# Patient Record
Sex: MALE | Race: WHITE | NOT HISPANIC OR LATINO | Employment: FULL TIME | URBAN - METROPOLITAN AREA
[De-identification: names, ages, dates, MRNs, and addresses within clinical notes are randomized per-mention and may not be internally consistent; named-entity substitution may affect disease eponyms.]

---

## 2018-01-30 ENCOUNTER — OFFICE VISIT (OUTPATIENT)
Dept: FAMILY MEDICINE | Facility: CLINIC | Age: 43
End: 2018-01-30
Payer: COMMERCIAL

## 2018-01-30 VITALS
OXYGEN SATURATION: 95 % | DIASTOLIC BLOOD PRESSURE: 96 MMHG | BODY MASS INDEX: 38.38 KG/M2 | TEMPERATURE: 96.8 F | WEIGHT: 289.6 LBS | HEART RATE: 79 BPM | HEIGHT: 73 IN | SYSTOLIC BLOOD PRESSURE: 136 MMHG

## 2018-01-30 DIAGNOSIS — G47.19 EXCESSIVE DAYTIME SLEEPINESS: ICD-10-CM

## 2018-01-30 DIAGNOSIS — Z13.220 SCREENING FOR LIPID DISORDERS: ICD-10-CM

## 2018-01-30 DIAGNOSIS — Z00.01 ENCOUNTER FOR ROUTINE ADULT MEDICAL EXAM WITH ABNORMAL FINDINGS: Primary | ICD-10-CM

## 2018-01-30 DIAGNOSIS — R06.83 SNORING: ICD-10-CM

## 2018-01-30 DIAGNOSIS — R09.81 NASAL CONGESTION: ICD-10-CM

## 2018-01-30 DIAGNOSIS — Z13.1 SCREENING FOR DIABETES MELLITUS: ICD-10-CM

## 2018-01-30 LAB
CHOLEST SERPL-MCNC: 177 MG/DL
GLUCOSE SERPL-MCNC: 80 MG/DL (ref 70–99)
HDLC SERPL-MCNC: 29 MG/DL
LDLC SERPL CALC-MCNC: 84 MG/DL
NONHDLC SERPL-MCNC: 148 MG/DL
TRIGL SERPL-MCNC: 320 MG/DL
TSH SERPL DL<=0.005 MIU/L-ACNC: 2.16 MU/L (ref 0.4–4)

## 2018-01-30 PROCEDURE — 99213 OFFICE O/P EST LOW 20 MIN: CPT | Mod: 25 | Performed by: FAMILY MEDICINE

## 2018-01-30 PROCEDURE — 99396 PREV VISIT EST AGE 40-64: CPT | Performed by: FAMILY MEDICINE

## 2018-01-30 PROCEDURE — 84443 ASSAY THYROID STIM HORMONE: CPT | Performed by: FAMILY MEDICINE

## 2018-01-30 PROCEDURE — 36415 COLL VENOUS BLD VENIPUNCTURE: CPT | Performed by: FAMILY MEDICINE

## 2018-01-30 PROCEDURE — 82947 ASSAY GLUCOSE BLOOD QUANT: CPT | Performed by: FAMILY MEDICINE

## 2018-01-30 PROCEDURE — 80061 LIPID PANEL: CPT | Performed by: FAMILY MEDICINE

## 2018-01-30 RX ORDER — FLUTICASONE PROPIONATE 50 MCG
1-2 SPRAY, SUSPENSION (ML) NASAL DAILY
Qty: 3 BOTTLE | Refills: 1 | Status: SHIPPED | OUTPATIENT
Start: 2018-01-30 | End: 2019-12-06

## 2018-01-30 NOTE — PROGRESS NOTES
SUBJECTIVE:   CC: Dion Tatum is an 42 year old male who presents for preventative health visit.     Healthy Habits:    Do you get at least three servings of calcium containing foods daily (dairy, green leafy vegetables, etc.)? yes    Amount of exercise or daily activities, outside of work: 1 day(s) per week -     Problems taking medications regularly No    Medication side effects: No    Have you had an eye exam in the past two years? yes    Do you see a dentist twice per year? yes    Do you have sleep apnea, excessive snoring or daytime drowsiness?yes         Patient understands that additional issues/concerns addressed,  which are above and beyond typical preventative care, may be charged as an extra cost to the patient.     Tired - worsened over the last 3-4 months    Cold symptoms several months ago and nose has never cleared.   Nostrils feel narrowed   Hard to breathe at night   Wakes often  Some snoring     Allergies as a child, not as an adult   Tried allergy pill  Tried breathe right strips  Tried afrin      The 10-year ASCVD risk score (Leticiajama GOTTLIEB Jr, et al., 2013) is: 2.6%    Values used to calculate the score:      Age: 42 years      Sex: Male      Is Non- : No      Diabetic: No      Tobacco smoker: No      Systolic Blood Pressure: 141 mmHg      Is BP treated: No      HDL Cholesterol: 32 mg/dL      Total Cholesterol: 172 mg/dL      Today's PHQ-2 Score: 0  PHQ-2 ( 1999 Pfizer) 1/30/2018 1/17/2018   Q1: Little interest or pleasure in doing things 0 0   Q2: Feeling down, depressed or hopeless 0 0   PHQ-2 Score 0 0   Q1: Little interest or pleasure in doing things - Not at all   Q2: Feeling down, depressed or hopeless - Not at all   PHQ-2 Score - 0       Abuse: Current or Past(Physical, Sexual or Emotional)- No  Do you feel safe in your environment - Yes    Social History   Substance Use Topics     Smoking status: Never Smoker     Smokeless tobacco: Never Used     Alcohol use 0.0  "oz/week     0 Standard drinks or equivalent per week      Comment: occasional      If you drink alcohol do you typically have >3 drinks per day or >7 drinks per week? No                      Last PSA: No results found for: PSA    Reviewed orders with patient. Reviewed health maintenance and updated orders accordingly - Yes  Labs reviewed in EPIC    Reviewed and updated as needed this visit by clinical staff         Reviewed and updated as needed this visit by Provider            ROS:  C: NEGATIVE for fever, chills, change in weight  CONSTITUTIONAL:tired  E: NEGATIVE for vision changes or irritation  ENT: nasal congestion  R: NEGATIVE for significant cough or SOB  CV: NEGATIVE for chest pain, palpitations or peripheral edema  GI: NEGATIVE for nausea, abdominal pain, heartburn, or change in bowel habits   male: negative for dysuria, hematuria, decreased urinary stream, erectile dysfunction, urethral discharge  M: NEGATIVE for significant arthralgias or myalgia  N: NEGATIVE for weakness, dizziness or paresthesias  P: NEGATIVE for changes in mood or affect    OBJECTIVE:   BP (!) 136/96 (BP Location: Right arm, Patient Position: Sitting, Cuff Size: Adult Large)  Pulse 79  Temp 96.8  F (36  C) (Tympanic)  Ht 6' 1\" (1.854 m)  Wt 289 lb 9.6 oz (131.4 kg)  SpO2 95%  BMI 38.21 kg/m2   BP Readings from Last 6 Encounters:   01/30/18 (!) 141/99   01/30/18 (!) 136/96   01/11/17 130/89   03/07/16 (!) 146/96   12/29/15 136/89   04/02/14 122/79     Wt Readings from Last 10 Encounters:   01/30/18 289 lb 9.6 oz (131.4 kg)   01/30/18 289 lb 9.6 oz (131.4 kg)   12/29/16 291 lb (132 kg)   03/07/16 284 lb (128.8 kg)   12/29/15 287 lb 14.4 oz (130.6 kg)   04/02/14 288 lb (130.6 kg)   03/24/14 279 lb (126.6 kg)   03/12/14 281 lb 6.4 oz (127.6 kg)   06/01/12 275 lb 12.8 oz (125.1 kg)   02/17/12 279 lb 3.2 oz (126.6 kg)       EXAM:  GENERAL: healthy, alert and no distress  EYES: Eyes grossly normal to inspection, PERRL and " conjunctivae and sclerae normal  HENT: ear canals and TM's normal, nose and mouth without ulcers or lesions  NECK: no adenopathy, no asymmetry, masses, or scars and thyroid normal to palpation  Neck is wide.   RESP: lungs clear to auscultation - no rales, rhonchi or wheezes  CV: regular rate and rhythm, normal S1 S2, no S3 or S4, no murmur, click or rub, no peripheral edema and peripheral pulses strong  ABDOMEN: soft, nontender, no hepatosplenomegaly, no masses and bowel sounds normal  MS: no gross musculoskeletal defects noted, no edema  SKIN: no suspicious lesions or rashes  NEURO: Normal strength and tone, mentation intact and speech normal  PSYCH: mentation appears normal, affect normal/bright    ASSESSMENT/PLAN:     (Z00.01) Encounter for routine adult medical exam with abnormal findings  (primary encounter diagnosis)  Comment: We discussed self testicular exams, exercise 30mins/day, and calcium with vitamin D at 1200mg/day, preferably from dietary sources.  Diet, Weight loss, and Exercise were discussed as well.   Plan:     (Z13.220) Screening for lipid disorders  Comment:   Plan: Lipid panel reflex to direct LDL Fasting            (Z13.1) Screening for diabetes mellitus  Comment:   Plan: Glucose            (G47.19) Excessive daytime sleepiness  Comment: Discussed symptoms and start with sleep evaluation   Plan: SLEEP EVALUATION & MANAGEMENT REFERRAL - [facility]  [phone]        (Age 2 and up), TSH with free T4 reflex            (R06.83) Snoring  Comment:   Plan: SLEEP EVALUATION & MANAGEMENT REFERRAL - [facility]  [phone]        (Age 2 and up)            (R09.81) Nasal congestion  Comment: Discussed trial of allergy meds and neti pot. Be persistent as this can take several weeks to work.   Plan: fluticasone (FLONASE) 50 MCG/ACT spray            COUNSELING:  Reviewed preventive health counseling, as reflected in patient  "instructions       Regular exercise       Healthy diet/nutrition    BP Screening:   Last 3 BP Readings:    BP Readings from Last 3 Encounters:   01/30/18 (!) 141/99   01/30/18 (!) 136/96   01/11/17 130/89       The following was recommended to the patient:  Recommend lifestyle modifications AND TESTING FOR JEFF     reports that he has never smoked. He has never used smokeless tobacco.    Estimated body mass index is 37.11 kg/(m^2) as calculated from the following:    Height as of 12/29/16: 6' 2.25\" (1.886 m).    Weight as of 12/29/16: 291 lb (132 kg).   Weight management plan: Discussed healthy diet and exercise guidelines and patient will follow up in 6 months in clinic to re-evaluate.    Counseling Resources:  ATP IV Guidelines  Pooled Cohorts Equation Calculator  FRAX Risk Assessment  ICSI Preventive Guidelines  Dietary Guidelines for Americans, 2010  USDA's MyPlate  ASA Prophylaxis  Lung CA Screening    Azalea Cervantes MD  [facility]  "

## 2018-01-30 NOTE — MR AVS SNAPSHOT
After Visit Summary   1/30/2018    Doin Tatum    MRN: [id number]           Patient Information     Date Of Birth          1975        Visit Information        Provider Department      1/30/2018 7:00 AM Azalea Cervantes MD [facility]        Today's Diagnoses     Screening for lipid disorders    -  1    Routine general medical examination at a health care facility        Encounter for routine adult medical exam with abnormal findings        Screening for diabetes mellitus        Excessive daytime sleepiness        Snoring        Nasal congestion          Care Instructions      Nasal congestion  Daily use of flonase - prescribe   Daily use of cetirizine 10mg/day ( zyrtec)        Preventive Health Recommendations  Male Ages 40 to 49    Yearly exam:             See your health care provider every year in order to  o   Review health changes.   o   Discuss preventive care.    o   Review your medicines if your doctor has prescribed any.    You should be tested each year for STDs (sexually transmitted diseases) if you re at risk.     Have a cholesterol test every 5 years.     Have a colonoscopy (test for colon cancer) if someone in your family has had colon cancer or polyps before age 50.     After age 45, have a diabetes test (fasting glucose). If you are at risk for diabetes, you should have this test every 3 years.      Talk with your health care provider about whether or not a prostate cancer screening test (PSA) is right for you.    Shots: Get a flu shot each year. Get a tetanus shot every 10 years.     Nutrition:    Eat at least 5 servings of fruits and vegetables daily.     Eat whole-grain bread, whole-wheat pasta and brown rice instead of white grains and rice.     Talk to your provider about Calcium and Vitamin D.     Lifestyle    Exercise for at least 150 minutes a week (30 minutes a day, 5 days a week). This will help you control your weight and prevent disease.     Limit  alcohol to one drink per day.     No smoking.     Wear sunscreen to prevent skin cancer.     See your dentist every six months for an exam and cleaning.              Follow-ups after your visit        Additional Services     SLEEP EVALUATION & MANAGEMENT REFERRAL - [facility]  [phone] (Age 2 and up)       Please be aware that coverage of these services is subject to the terms and limitations of your health insurance plan.  Call member services at your health plan with any benefit or coverage questions.      Please bring the following to your appointment:    >>   List of current medications   >>   This referral request   >>   Any documents/labs given to you for this referral                      Future tests that were ordered for you today     Open Future Orders        Priority Expected Expires Ordered    SLEEP EVALUATION & MANAGEMENT REFERRAL - [facility]  [phone] (Age 2 and up) Routine  1/30/2019 1/30/2018            Who to contact     Normal or non-critical lab and imaging results will be communicated to you by Sclobyhart, letter or phone within 4 business days after the clinic has received the results. If you do not hear from us within 7 days, please contact the clinic through Sclobyhart or phone. If you have a critical or abnormal lab result, we will notify you by phone as soon as possible.  Submit refill requests through pickrset or call your pharmacy and they will forward the refill request to us. Please allow 3 business days for your refill to be completed.          If you need to speak with a  for additional information , please call: [phone]             Additional Information About Your Visit        pickrset Information     pickrset gives you secure access to your electronic health record. If you see a primary care provider, you can also send messages to your care team and make appointments. If you have questions, please call  "your primary care clinic.  If you do not have a primary care provider, please call [phone] and they will assist you.        Care EveryWhere ID     This is your Care EveryWhere ID. This could be used by other organizations to access your [place] medical records  [id number]        Your Vitals Were     Pulse Temperature Height Pulse Oximetry BMI (Body Mass Index)       79 96.8  F (36  C) (Tympanic) 6' 1\" (1.854 m) 95% 38.21 kg/m2        Blood Pressure from Last 3 Encounters:   01/30/18 (!) 141/99   01/30/18 (!) 136/96   01/11/17 130/89    Weight from Last 3 Encounters:   01/30/18 289 lb 9.6 oz (131.4 kg)   01/30/18 289 lb 9.6 oz (131.4 kg)   12/29/16 291 lb (132 kg)              We Performed the Following     Glucose     Lipid panel reflex to direct LDL Fasting     TSH with free T4 reflex          Today's Medication Changes          These changes are accurate as of 1/30/18  7:48 AM.  If you have any questions, ask your nurse or doctor.               Start taking these medicines.        Dose/Directions    fluticasone 50 MCG/ACT spray   Commonly known as:  FLONASE   Used for:  Nasal congestion   Started by:  Azalea Cervantes MD        Dose:  1-2 spray   Spray 1-2 sprays into both nostrils daily   Quantity:  3 Bottle   Refills:  1            Where to get your medicines      These medications were sent to [place] [address] [place] [address]  [address] [facility] [address]     Phone:  [phone]     fluticasone 50 MCG/ACT spray                Primary Care Provider Office Phone # Fax #    Briana Rivera [phone] [address]  [facility] [address]        Equal Access to Services     NASEEM ENGEL AH: Hadmarti Summers, waaxda luqadaha, qaybta kaalmada rachael, papo harman. So [facility] [phone].    ATENCIÓN: Si habla español, tiene a zamudio disposición servicios gratuitos de asistencia lingüística. Llame al " [phone].    We comply with applicable federal civil rights laws and [place] laws. We do not discriminate on the basis of race, color, national origin, age, disability, sex, sexual orientation, or gender identity.            Thank you!     Thank you for choosing [facility]  for your care. Our goal is always to provide you with excellent care. Hearing back from our patients is one way we can continue to improve our services. Please take a few minutes to complete the written survey that you may receive in the mail after your visit with us. Thank you!             Your Updated Medication List - Protect others around you: Learn how to safely use, store and throw away your medicines at www.disposemymeds.org.          This list is accurate as of 1/30/18  7:48 AM.  Always use your most recent med list.                   Brand Name Dispense Instructions for use Diagnosis    fish oil-omega-3 fatty acids 1000 MG capsule      Take 2 g by mouth daily.        fluticasone 50 MCG/ACT spray    FLONASE    3 Bottle    Spray 1-2 sprays into both nostrils daily    Nasal congestion       FRUIT & VEGETABLE DAILY PO      Take  by mouth.

## 2018-01-30 NOTE — PATIENT INSTRUCTIONS
Nasal congestion  Daily use of flonase - prescribe   Daily use of cetirizine 10mg/day ( zyrtec)        Preventive Health Recommendations  Male Ages 40 to 49    Yearly exam:             See your health care provider every year in order to  o   Review health changes.   o   Discuss preventive care.    o   Review your medicines if your doctor has prescribed any.    You should be tested each year for STDs (sexually transmitted diseases) if you re at risk.     Have a cholesterol test every 5 years.     Have a colonoscopy (test for colon cancer) if someone in your family has had colon cancer or polyps before age 50.     After age 45, have a diabetes test (fasting glucose). If you are at risk for diabetes, you should have this test every 3 years.      Talk with your health care provider about whether or not a prostate cancer screening test (PSA) is right for you.    Shots: Get a flu shot each year. Get a tetanus shot every 10 years.     Nutrition:    Eat at least 5 servings of fruits and vegetables daily.     Eat whole-grain bread, whole-wheat pasta and brown rice instead of white grains and rice.     Talk to your provider about Calcium and Vitamin D.     Lifestyle    Exercise for at least 150 minutes a week (30 minutes a day, 5 days a week). This will help you control your weight and prevent disease.     Limit alcohol to one drink per day.     No smoking.     Wear sunscreen to prevent skin cancer.     See your dentist every six months for an exam and cleaning.

## 2018-01-30 NOTE — NURSING NOTE
"Chief Complaint   Patient presents with     Physical       Initial BP (!) 136/96 (BP Location: Right arm, Patient Position: Sitting, Cuff Size: Adult Large)  Pulse 79  Temp 96.8  F (36  C) (Tympanic)  Ht 6' 1\" (1.854 m)  Wt 289 lb 9.6 oz (131.4 kg)  SpO2 95%  BMI 38.21 kg/m2 Estimated body mass index is 38.21 kg/(m^2) as calculated from the following:    Height as of this encounter: 6' 1\" (1.854 m).    Weight as of this encounter: 289 lb 9.6 oz (131.4 kg).  Medication Reconciliation: complete   Omaira Hamlin LPN    "

## 2018-02-02 ENCOUNTER — OFFICE VISIT (OUTPATIENT)
Dept: SLEEP MEDICINE | Facility: CLINIC | Age: 43
End: 2018-02-02
Payer: COMMERCIAL

## 2018-02-02 VITALS
OXYGEN SATURATION: 92 % | BODY MASS INDEX: 37.63 KG/M2 | HEIGHT: 74 IN | SYSTOLIC BLOOD PRESSURE: 148 MMHG | WEIGHT: 293.2 LBS | DIASTOLIC BLOOD PRESSURE: 92 MMHG | HEART RATE: 83 BPM

## 2018-02-02 DIAGNOSIS — E66.09 OBESITY DUE TO EXCESS CALORIES, UNSPECIFIED CLASSIFICATION, UNSPECIFIED WHETHER SERIOUS COMORBIDITY PRESENT: ICD-10-CM

## 2018-02-02 DIAGNOSIS — R06.81 APNEA: Primary | ICD-10-CM

## 2018-02-02 DIAGNOSIS — R06.83 SNORING: ICD-10-CM

## 2018-02-02 DIAGNOSIS — G47.19 EXCESSIVE DAYTIME SLEEPINESS: ICD-10-CM

## 2018-02-02 PROCEDURE — 99205 OFFICE O/P NEW HI 60 MIN: CPT | Performed by: FAMILY MEDICINE

## 2018-02-02 NOTE — PROGRESS NOTES
Sleep Consultation:    Date on this visit: 2/2/2018    Dion Tatum is sent by Azalea Cervantes for a sleep consultation regarding high likelihood of sleep-disordered breathing.    Primary Physician: Briana Rivera       HPI:  Dion Tatum is a 43yo male with a past medical history significant for obesity (BMI 37), elevated blood pressures, chronic fatigue and anxiety who presents to the sleep clinic to assess for obstructive sleep apnea.     Dion was first seen at the sleep clinic (4/2/2014) for concerns loud snoring. Since then he has noticed increased fatigue. At that time, he use to sleep with his wife and she did not notice any observed apneic events. Today, it is becoming more frequent that they sleep separate due to his snoring. He predominately sleeps on his back and side. He says that he goes to be around 11pm and it takes him 30mins to fall asleep. He then wakes up 1-2 times in the middle of the night to go to the bathroom or to chance position. If he cant seem to fall back asleep he will watch TV. He wakes up at 730am to see his kids off to school. He gets about 7-8 hours of sleep per night. At times he feels like he has restless nights of sleep and has poor sleep quality. The has worrying thoughts that keep him up longer than he would anticipate. To accommodate the fatigue he drinks 2-3 cups of coffee in the morning and a diet soda in the afternoon. He has a pretty stressful life style. 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     Of note, he wakes up sometimes with a stress headache that he thinks stems from his neck and back because they are sore too. He does not take naps because he is so busy. He can stay awake during the day without trouble. He has not been in any MVA because of sleep related issues.  Patient's [place]ron Sleepiness score 7/24 consistent with mild daytime sleepiness.He is adopted so he does not know his family history.     Allergies:     Allergies   Allergen Reactions     Penicillins        Medications:    Current Outpatient Prescriptions   Medication Sig Dispense Refill     fluticasone (FLONASE) 50 MCG/ACT spray Spray 1-2 sprays into both nostrils daily 3 Bottle 1     fish oil-omega-3 fatty acids (FISH OIL) 1000 MG capsule Take 2 g by mouth daily.       Nutritional Supplements (FRUIT & VEGETABLE DAILY PO) Take  by mouth.         Problem List:  Patient Active Problem List    Diagnosis Date Noted     Obesity (BMI 30-39.9) 12/29/2015     Priority: Medium     Shoulder joint pain 03/12/2014     Priority: Medium     Home program and will call if wants physical therapy.        Atypical chest pain 03/12/2014     Priority: Medium     Cardiac stress test. Low likelihood.        Generalized anxiety disorder 08/16/2012     Priority: Medium     Diagnosis updated by automated process. Provider to review and confirm.       Chronic fatigue 02/17/2012     Priority: Medium     Obesity 02/17/2012     Priority: Medium     Elevated blood pressure 02/17/2012     Priority: Medium     Snoring 02/17/2012     Priority: Medium     CARDIOVASCULAR SCREENING; LDL GOAL LESS THAN 160 10/31/2010     Priority: Medium        Past Medical/Surgical History:  Past Medical History:   Diagnosis Date     Chronic fatigue      Hypertension 2015    slightly higher than normal     Obesity      Past Surgical History:   Procedure Laterality Date     NO HISTORY OF SURGERY       [facility]         Social History:  Social History     Social History     Marital status:      Spouse name: N/A     Number of children: N/A     Years of education: N/A     Occupational History     [employer]ing [employer]     Social History Main Topics     Smoking status: Never Smoker     Smokeless tobacco: Never Used     Alcohol use 0.0 oz/week      Comment: occasional     Drug use: No     Sexual activity: Yes     Partners: Female     Birth control/ protection: Pill     Other Topics  "Concern     Parent/Sibling W/ Cabg, Mi Or Angioplasty Before 65f 55m? No     NA - adopted      Service No     Blood Transfusions No     Caffeine Concern No     Occupational Exposure No     Hobby Hazards No     Sleep Concern Yes     hard time falling asleep     Stress Concern Yes     Weight Concern Yes     feels he needs to loose some weight     Special Diet No     Back Care No     Exercise Yes     occasionally     Bike Helmet No     Seat Belt Yes     Self-Exams Yes     Social History Narrative       Family History:  Family History   Problem Relation Age of Onset     Adopted: Yes     Unknown/Adopted Mother      Unknown/Adopted Father        Review of Systems:  A complete review of systems reviewed by me is negative with the exeption of what has been mentioned in the history of present illness.  CONSTITUTIONAL:  POSITIVE for  weight gain and sweats  EYES:  POSITIVE for changes in vision  ENT: NEGATIVE for ear pain, sore throat, sinus pain, post-nasal drip, runny nose, bloody nose  CARDIAC:  POSITIVE for  High blood pressure  NEUROLOGIC:  POSITIVE for  headaches  DERMATOLOGIC: NEGATIVE for rashes, new moles or change in mole(s)  PULMONARY:  POSITIVE for  SOB with activity and productive cough  GASTROINTESTINAL: NEGATIVE for nausea or vomitting, loose or watery stools, fat or grease in stools, constipation, abdominal pain, bowel movements black in color or blood noted.  GENITOURINARY: NEGATIVE for pain during urination, blood in urine, urinating more frequently than usual, irregular menstrual periods.  MUSCULOSKELETAL: NEGATIVE for muscle pain, bone or joint pain, swollen joints.  ENDOCRINE: NEGATIVE for increased thirst or urination, diabetes.  LYMPHATIC: NEGATIVE for swollen lymph nodes, lumps or bumps in the breasts or nipple discharge.    Physical Examination:  Vitals: BP (!) 148/92  Pulse 83  Ht 1.88 m (6' 2\")  Wt 133 kg (293 lb 3.2 oz)  SpO2 92%  BMI 37.64 kg/m2  BMI= Body mass index is 37.64 " kg/(m^2).  Neck: 20.5 inches (52cm)       [place] Total Score 4/2/2014   Total score - [place] 7     Impression/Plan:  Dion Tatum is a 41yo male with a past medical history significant for obesity (BMI 37), elevated blood pressures, chronic fatigue and anxiety who presents to the sleep clinic to assess for obstructive sleep apnea.     #Highly probable JEFF  Dion has a STOP-BANG of 5; snoring, fatigue, BMI 37, Neck 20.5 inches, and male gender. These risk factors would warrant an JEFF workup. He was last seen in the sleep clinic in 2014. A sleep study was recommended but Dion did not follow-up to complete the study. He is interested in the HST which would be an appropriate sleep study to look for JEFF. He has had previous workups for chronic fatigue. TSH was normal (1/30/2018).   Plan:  - Schedule Home sleep study   - He will follow up with me in approximately two weeks after his sleep study has been competed to review the results and discuss plan of care.       Obstructive sleep apnea reviewed.  Complications of untreated sleep apnea were reviewed.    Scribe Disclosure:   IDavid, MS3, am serving as a scribe; to document services personally performed by Dr. Rashi Muñoz, based on data collection and the provider's statements to me.     Provider Disclosure:  IRashi, agree with above History, Review of Systems, Physical exam and Plan.  I have reviewed the content of the documentation and have edited it as needed. I have personally performed the services documented here and the documentation accurately represents those services and the decisions I have made.      I have spent 60 minutes with this patient today in which greater than 50% of this time was spent in the counseling / coordination of care.      Rashi Muñoz MD      CC: Azalea Cervantes

## 2018-02-02 NOTE — PATIENT INSTRUCTIONS

## 2018-02-02 NOTE — MR AVS SNAPSHOT
After Visit Summary   2/2/2018    Dion Tatum    MRN: [id number]           Patient Information     Date Of Birth          1975        Visit Information        Provider Department      2/2/2018 8:30 AM Rashi Muñoz MD [facility]        Today's Diagnoses     Apnea    -  1    Excessive daytime sleepiness        Snoring        Obesity due to excess calories, unspecified classification, unspecified whether serious comorbidity present          Care Instructions      Your BMI is Body mass index is 37.64 kg/(m^2).  Weight management is a personal decision.  If you are interested in exploring weight loss strategies, the following discussion covers the approaches that may be successful. Body mass index (BMI) is one way to tell whether you are at a healthy weight, overweight, or obese. It measures your weight in relation to your height.  A BMI of 18.5 to 24.9 is in the healthy range. A person with a BMI of 25 to 29.9 is considered overweight, and someone with a BMI of 30 or greater is considered obese. More than two-thirds of American adults are considered overweight or obese.  Being overweight or obese increases the risk for further weight gain. Excess weight may lead to heart disease and diabetes.  Creating and following plans for healthy eating and physical activity may help you improve your health.  Weight control is part of healthy lifestyle and includes exercise, emotional health, and healthy eating habits. Careful eating habits lifelong are the mainstay of weight control. Though there are significant health benefits from weight loss, long-term weight loss with diet alone may be very difficult to achieve- studies show long-term success with dietary management in less than 10% of people. Attaining a healthy weight may be especially difficult to achieve in those with severe obesity. In some cases, medications, devices and surgical management might be considered.  What  can you do?  If you are overweight or obese and are interested in methods for weight loss, you should discuss this with your provider.     Consider reducing daily calorie intake by 500 calories.     Keep a food journal.     Avoiding skipping meals, consider cutting portions instead.    Diet combined with exercise helps maintain muscle while optimizing fat loss. Strength training is particularly important for building and maintaining muscle mass. Exercise helps reduce stress, increase energy, and improves fitness. Increasing exercise without diet control, however, may not burn enough calories to loose weight.       Start walking three days a week 10-20 minutes at a time    Work towards walking thirty minutes five days a week     Eventually, increase the speed of your walking for 1-2 minutes at time    In addition, we recommend that you review healthy lifestyles and methods for weight loss available through the National Institutes of Health patient information sites:  http://win.niddk.nih.gov/publications/index.htm    And look into health and wellness programs that may be available through your health insurance provider, employer, local community center, or corrina club.    Weight management plan: Patient was referred to their PCP to discuss a diet and exercise plan.             Follow-ups after your visit        Follow-up notes from your care team     Return if symptoms worsen or fail to improve.      Your next 10 appointments already scheduled     Feb 23, 2018  3:30 PM CST   HST  with SLEEP LAB, BED FIVE   [facility]    [address]  [facility] [address]   [phone]            Feb 26, 2018  9:00 AM CST   HST Drop Off with SLEEP LAB, BED FIVE   [facility]    [address]  [facility] [address]   [phone]            Feb 26, 2018  9:30 AM CST   Return Sleep Patient with Rashi  "Abelino Muñoz MD   [facility]    [address]  [facility] [address] [phone]              Who to contact     If you have questions or need follow up information about today's clinic visit or your schedule please contact [facility] directly at [phone].  Normal or non-critical lab and imaging results will be communicated to you by MyChart, letter or phone within 4 business days after the clinic has received the results. If you do not hear from us within 7 days, please contact the clinic through [employer] [place] [employer] or phone. If you have a critical or abnormal lab result, we will notify you by phone as soon as possible.  Submit refill requests through [employer] or call your pharmacy and they will forward the refill request to us. Please allow 3 business days for your refill to be completed.          Additional Information About Your Visit        [place] [id number] Information     [employer] gives you secure access to your electronic health record. If you see a primary care provider, you can also send messages to your care team and make appointments. If you have questions, please call your primary care clinic.  If you do not have a primary care provider, please call [phone] and they will assist you.        Care EveryWhere ID     This is your Care EveryWhere ID. This could be used by other organizations to access your [place] medical records  [id number]        Your Vitals Were     Pulse Height Pulse Oximetry BMI (Body Mass Index)          83 1.88 m (6' 2\") 92% 37.64 kg/m2         Blood Pressure from Last 3 Encounters:   02/02/18 (!) 148/92   01/30/18 (!) 141/99   01/30/18 (!) 136/96    Weight from Last 3 Encounters:   02/02/18 133 kg (293 lb 3.2 oz)   01/30/18 131.4 kg (289 lb 9.6 oz)   01/30/18 131.4 kg (289 lb 9.6 oz)              We Performed the Following     SLEEP EVALUATION & MANAGEMENT REFERRAL - ADULT -[facility]  " [phone] (Age 2 and up)        Primary Care Provider Office Phone # Fax #    Briana Rivera [phone] [address] [place] [address]        Equal Access to Services     ADAM ENGEL : Hadii nikia ku hadsurindero Soomaali, waaxda luqadaha, qaybta kaalmada adeegyada, papo barbourn mir sousa lashalomnicole harman. So [facility] [phone].    ATENCIÓN: Si habla español, tiene a zamudio disposición servicios gratuitos de asistencia lingüística. Llame al [phone].    We comply with applicable federal civil rights laws and [place] laws. We do not discriminate on the basis of race, color, national origin, age, disability, sex, sexual orientation, or gender identity.            Thank you!     Thank you for choosing [facility]  for your care. Our goal is always to provide you with excellent care. Hearing back from our patients is one way we can continue to improve our services. Please take a few minutes to complete the written survey that you may receive in the mail after your visit with us. Thank you!             Your Updated Medication List - Protect others around you: Learn how to safely use, store and throw away your medicines at www.disposemymeds.org.          This list is accurate as of 2/2/18 11:59 PM.  Always use your most recent med list.                   Brand Name Dispense Instructions for use Diagnosis    fish oil-omega-3 fatty acids 1000 MG capsule      Take 2 g by mouth daily.        fluticasone 50 MCG/ACT spray    FLONASE    3 Bottle    Spray 1-2 sprays into both nostrils daily    Nasal congestion       FRUIT & VEGETABLE DAILY PO      Take  by mouth.

## 2018-02-02 NOTE — NURSING NOTE
"Chief Complaint   Patient presents with     Sleep Problem     Consult; complains of being tired; snoring       Initial BP (!) 148/92  Pulse 83  Ht 1.88 m (6' 2\")  Wt 133 kg (293 lb 3.2 oz)  SpO2 92%  BMI 37.64 kg/m2 Estimated body mass index is 37.64 kg/(m^2) as calculated from the following:    Height as of this encounter: 1.88 m (6' 2\").    Weight as of this encounter: 133 kg (293 lb 3.2 oz).  Medication Reconciliation: complete   "

## 2018-02-14 ENCOUNTER — MEDICAL CORRESPONDENCE (OUTPATIENT)
Dept: HEALTH INFORMATION MANAGEMENT | Facility: CLINIC | Age: 43
End: 2018-02-14

## 2018-02-23 ENCOUNTER — OFFICE VISIT (OUTPATIENT)
Dept: SLEEP MEDICINE | Facility: CLINIC | Age: 43
End: 2018-02-23
Payer: COMMERCIAL

## 2018-02-23 DIAGNOSIS — R06.83 SNORING: ICD-10-CM

## 2018-02-23 DIAGNOSIS — R06.81 APNEA: ICD-10-CM

## 2018-02-23 DIAGNOSIS — E66.09 OBESITY DUE TO EXCESS CALORIES, UNSPECIFIED CLASSIFICATION, UNSPECIFIED WHETHER SERIOUS COMORBIDITY PRESENT: ICD-10-CM

## 2018-02-23 DIAGNOSIS — G47.19 EXCESSIVE DAYTIME SLEEPINESS: ICD-10-CM

## 2018-02-23 NOTE — MR AVS SNAPSHOT
After Visit Summary   2/23/2018    Dion Tatum    MRN: [id number]           Patient Information     Date Of Birth          1975        Visit Information        Provider Department      2/23/2018 3:30 PM SLEEP LAB, BED FIVE [facility]        Today's Diagnoses     Apnea        Obesity due to excess calories, unspecified classification, unspecified whether serious comorbidity present        Snoring        Excessive daytime sleepiness           Follow-ups after your visit        Your next 10 appointments already scheduled     Feb 26, 2018  9:00 AM CST   HST Drop Off with SLEEP LAB, BED FIVE   [facility] [place]    [address]  [facility] [address]   [phone]            Feb 26, 2018  9:30 AM CST   Return Sleep Patient with Rashi Muñoz MD   [facility]    [address]  [facility] [address]   [phone]              Who to contact     If you have questions or need follow up information about today's clinic visit or your schedule please contact [facility] directly at [phone].  Normal or non-critical lab and imaging results will be communicated to you by MyChart, letter or phone within 4 business days after the clinic has received the results. If you do not hear from us within 7 days, please contact the clinic through [employer] or phone. If you have a critical or abnormal lab result, we will notify you by phone as soon as possible.  Submit refill requests through [employer] video or call your pharmacy and they will forward the refill request to us. Please allow 3 business days for your refill to be completed.          Additional Information About Your Visit        MyChart Information     CC video gives you secure access to your electronic health record. If you see a primary care provider, you can also send messages to your care team  and make appointments. If you have questions, please call your primary care clinic.  If you do not have a primary care provider, please call [phone] and they will assist you.        Care EveryWhere ID     This is your Care EveryWhere ID. This could be used by other organizations to access your [place] medical records  [id number]         Blood Pressure from Last 3 Encounters:   02/02/18 (!) 148/92   01/30/18 (!) 141/99   01/30/18 (!) 136/96    Weight from Last 3 Encounters:   02/02/18 133 kg (293 lb 3.2 oz)   01/30/18 131.4 kg (289 lb 9.6 oz)   01/30/18 131.4 kg (289 lb 9.6 oz)              We Performed the Following     HST-Home Sleep Apnea Test        Primary Care Provider Office Phone # Fax #    Briana Rivera [phone] [address] [facility] [address]        Equal Access to Services     [facility]: Hadii aad ku hadasho Soomaali, waaxda luqadaha, qaybta kaalmada adeegyada, waxay idiin hayaan mir weiner . So [facility] [phone].    ATENCIÓN: Si habla español, tiene a zamudio disposición servicios gratuitos de asistencia lingüística. Llame al [phone].    We comply with applicable federal civil rights laws and [place] laws. We do not discriminate on the basis of race, color, national origin, age, disability, sex, sexual orientation, or gender identity.            Thank you!     Thank you for choosing [facility]  for your care. Our goal is always to provide you with excellent care. Hearing back from our patients is one way we can continue to improve our services. Please take a few minutes to complete the written survey that you may receive in the mail after your visit with us. Thank you!             Your Updated Medication List - Protect others around you: Learn how to safely use, store and throw away your medicines at www.disposemymeds.org.          This list is accurate as of 2/23/18  4:14 PM.  Always use your most recent med list.                    Brand Name Dispense Instructions for use Diagnosis    fish oil-omega-3 fatty acids 1000 MG capsule      Take 2 g by mouth daily.        fluticasone 50 MCG/ACT spray    FLONASE    3 Bottle    Spray 1-2 sprays into both nostrils daily    Nasal congestion       FRUIT & VEGETABLE DAILY PO      Take  by mouth.

## 2018-02-23 NOTE — PROGRESS NOTES
SUBJECTIVE:  Chief Complaint   Patient presents with     Sleep Study      home sleep test         OBJECTIVE:  home sleep apnea test ordered.    Instructions were reviewed with Dion and were also printed for Dion to take with him.   Dion verbalized understanding and demonstrated use very well.     ASSESSMENT/PLAN:  Dion received home sleep apnea test today February 23, 2018. Pt will use device and will return on 2/26/18

## 2018-02-26 ENCOUNTER — DOCUMENTATION ONLY (OUTPATIENT)
Dept: SLEEP MEDICINE | Facility: CLINIC | Age: 43
End: 2018-02-26
Payer: COMMERCIAL

## 2018-02-26 NOTE — PROGRESS NOTES
Patient unable to do HST due to bad head cold.  He will call back to reschedule when he is feeling better.

## 2018-03-08 ENCOUNTER — TELEPHONE (OUTPATIENT)
Dept: SLEEP MEDICINE | Facility: CLINIC | Age: 43
End: 2018-03-08

## 2018-03-08 NOTE — TELEPHONE ENCOUNTER
SUBJECTIVE:  Chief Complaint   Patient presents with     Sleep Problem     schedule a home sleep test and results      OBJECTIVE:  Message received on clinic voicemail. Dion would like to reschedule a home sleep test due to previous illness. Will also need a follow up appointment to discuss results. He is open to a virtual/telephone visit if available.     ASSESSMENT/PLAN:  Attempted to contact Dion. Left message on answering machine for Dion to return call.

## 2018-03-15 ENCOUNTER — OFFICE VISIT (OUTPATIENT)
Dept: SLEEP MEDICINE | Facility: CLINIC | Age: 43
End: 2018-03-15
Payer: COMMERCIAL

## 2018-03-15 DIAGNOSIS — R06.83 SNORING: Primary | ICD-10-CM

## 2018-03-15 PROCEDURE — G0399 HOME SLEEP TEST/TYPE 3 PORTA: HCPCS | Performed by: FAMILY MEDICINE

## 2018-03-15 NOTE — PROGRESS NOTES
Patient set up with HST.    This HST performed using a Noxturnal T3 device which recorded snore sound, movement activity, body position, nasal pressure, oronasal thermal airflow, pulse, oximetry, both chest and abdominal respiratory effort. Patient was scored using 1B 4% rule.

## 2018-03-15 NOTE — MR AVS SNAPSHOT
After Visit Summary   3/15/2018    Dion Tatum    MRN: [id number]           Patient Information     Date Of Birth          1975        Visit Information        Provider Department      3/15/2018 9:30 AM SLEEP LAB, BED FIVE [facility]        Today's Diagnoses     Snoring    -  1       Follow-ups after your visit        Your next 10 appointments already scheduled     Mar 16, 2018  9:30 AM CDT   HST Drop Off with SLEEP LAB, BED FIVE   [facility] ([facility])    [address]  [facility] [address]   [phone]            Mar 23, 2018 12:00 PM CDT   Telephone Visit with Rashi Muñoz MD   [facility] ([facility])    [address]  [facility] [address]   [phone]           Note: this is not an onsite visit; there is no need to come to the facility.              Who to contact     If you have questions or need follow up information about today's clinic visit or your schedule please contact [facility] directly at [phone].  Normal or non-critical lab and imaging results will be communicated to you by MyChart, letter or phone within 4 business days after the clinic has received the results. If you do not hear from us within 7 days, please contact the clinic through [employer] or phone. If you have a critical or abnormal lab result, we will notify you by phone as soon as possible.  Submit refill requests through [employer] or call your pharmacy and they will forward the refill request to us. Please allow 3 business days for your refill to be completed.          Additional Information About Your Visit        MyChart Information     [employer] gives you secure access to your electronic health record. If you see a primary care provider, you can also send messages to your care team and make appointments. If you have questions, please call your primary care  clinic.  If you do not have a primary care provider, please call [phone] and they will assist you.        Care EveryWhere ID     This is your Care EveryWhere ID. This could be used by other organizations to access your [place] medical records  [id number]         Blood Pressure from Last 3 Encounters:   02/02/18 (!) 148/92   01/30/18 (!) 141/99   01/30/18 (!) 136/96    Weight from Last 3 Encounters:   02/02/18 133 kg (293 lb 3.2 oz)   01/30/18 131.4 kg (289 lb 9.6 oz)   01/30/18 131.4 kg (289 lb 9.6 oz)              Today, you had the following     No orders found for display       Primary Care Provider Office Phone # Fax #    Briana Rivera [phone] [address] [facility] [address]        Equal Access to Services     [facility]: Hadii aad ku hadasho Soomaali, waaxda luqadaha, qaybta kaalmada adeegyada, waxay andrein haycamn mir weiner . So [facility] [phone].    ATENCIÓN: Si habla español, tiene a zamudio disposición servicios gratuitos de asistencia lingüística. Llame al [phone].    We comply with applicable federal civil rights laws and [place] laws. We do not discriminate on the basis of race, color, national origin, age, disability, sex, sexual orientation, or gender identity.            Thank you!     Thank you for choosing [facility]  for your care. Our goal is always to provide you with excellent care. Hearing back from our patients is one way we can continue to improve our services. Please take a few minutes to complete the written survey that you may receive in the mail after your visit with us. Thank you!             Your Updated Medication List - Protect others around you: Learn how to safely use, store and throw away your medicines at www.disposemymeds.org.          This list is accurate as of 3/15/18  9:37 AM.  Always use your most recent med list.                   Brand Name Dispense Instructions for use Diagnosis    fish  oil-omega-3 fatty acids 1000 MG capsule      Take 2 g by mouth daily.        fluticasone 50 MCG/ACT spray    FLONASE    3 Bottle    Spray 1-2 sprays into both nostrils daily    Nasal congestion       FRUIT & VEGETABLE DAILY PO      Take  by mouth.

## 2018-03-16 ENCOUNTER — DOCUMENTATION ONLY (OUTPATIENT)
Dept: SLEEP MEDICINE | Facility: CLINIC | Age: 43
End: 2018-03-16
Payer: COMMERCIAL

## 2018-03-16 ENCOUNTER — DOCUMENTATION ONLY (OUTPATIENT)
Dept: SLEEP MEDICINE | Facility: CLINIC | Age: 43
End: 2018-03-16

## 2018-03-16 NOTE — PROCEDURES
"HOME SLEEP STUDY INTERPRETATION    Patient: Dion Tatum  MRN: [id number]  YOB: 1975  Study Date: 3/15/2018  Referring Provider: Briana Rivera  Ordering Provider: Rashi Muñoz MD     Indications for Home Study: Dion Tatum is a 42 year old male with a history of obesity, elevated blood pressure readings, chronic fatigue, anxiety who presents with symptoms suggestive of obstructive sleep apnea.    Estimated body mass index is 37.64 kg/(m^2) as calculated from the following:    Height as of 18: 1.88 m (6' 2\").    Weight as of 18: 133 kg (293 lb 3.2 oz).  Total score - [place]: 6 (2018  8:00 AM)  STOP-BAN/8    Data: A full night home sleep study was performed recording the standard physiologic parameters including body position, movement, sound, nasal pressure, thermal oral airflow, chest and abdominal movements with respiratory inductance plethysmography, and oxygen saturation by pulse oximetry. Pulse rate was estimated by oximetry recording. This study was considered adequate based on > 4 hours of quality oximetry and respiratory recording. As specified by the AASM Manual for the Scoring of Sleep and Associated events, version 2.3, Rule VIII.D 1B, 4% oxygen desaturation scoring for hypopneas is used as a standard of care on all home sleep apnea testing.    Analysis Time:  565.6 minutes    Respiration:   Sleep Associated Hypoxemia: sustained hypoxemia was not present. Baseline oxygen saturation was 91.9%.  Time with saturation less than or equal to 88% was 6.2 minutes. The lowest oxygen saturation was ~84%.   Snoring: Snoring was present.  Respiratory events: The home study revealed a presence of 86 obstructive apneas and 0 mixed and central apneas. There were 46 hypopneas resulting in a combined apnea/hypopnea index [AHI] of 14 events per hour.  AHI was 33.5 per hour supine, 0 per hour prone, 19.1 per hour on left side, and 9.1 per hour on right side.   Pattern: " Excluding events noted above, respiratory rate and pattern was Normal.    Position: Percent of time spent: supine - 12.2%, prone - 0%, on left - 18.7%, on right - 68.4%.    Heart Rate: By pulse oximetry normal rate was noted.     Assessment:   Mild obstructive sleep apnea.  Sleep associated hypoxemia was present.    Recommendations:  Consider auto-CPAP at 5-15 cmH2O, oral appliance therapy or polysomnography with full night PAP titration.  Suggest optimizing sleep hygiene and avoiding sleep deprivation.  Weight management.    Diagnosis Code(s): Obstructive Sleep Apnea G47.33, Hypoxemia G47.36    Rashi Muñoz MD, MD, March 16, 2018   Diplomate, American Board of Family Medicine, Sleep Medicine

## 2018-03-23 ENCOUNTER — VIRTUAL VISIT (OUTPATIENT)
Dept: SLEEP MEDICINE | Facility: CLINIC | Age: 43
End: 2018-03-23
Payer: COMMERCIAL

## 2018-03-23 ENCOUNTER — OFFICE VISIT (OUTPATIENT)
Dept: FAMILY MEDICINE | Facility: CLINIC | Age: 43
End: 2018-03-23
Payer: COMMERCIAL

## 2018-03-23 VITALS
SYSTOLIC BLOOD PRESSURE: 117 MMHG | BODY MASS INDEX: 36.73 KG/M2 | DIASTOLIC BLOOD PRESSURE: 85 MMHG | HEIGHT: 74 IN | WEIGHT: 286.2 LBS | HEART RATE: 88 BPM | OXYGEN SATURATION: 96 % | TEMPERATURE: 98.2 F

## 2018-03-23 DIAGNOSIS — J20.9 ACUTE BRONCHITIS WITH SYMPTOMS > 10 DAYS: Primary | ICD-10-CM

## 2018-03-23 DIAGNOSIS — G47.33 OSA (OBSTRUCTIVE SLEEP APNEA): Primary | ICD-10-CM

## 2018-03-23 DIAGNOSIS — G47.10 HYPERSOMNIA: ICD-10-CM

## 2018-03-23 PROCEDURE — 99442 ZZC PHYSICIAN TELEPHONE EVALUATION 11-20 MIN: CPT | Performed by: FAMILY MEDICINE

## 2018-03-23 PROCEDURE — 99213 OFFICE O/P EST LOW 20 MIN: CPT | Performed by: PHYSICIAN ASSISTANT

## 2018-03-23 RX ORDER — BENZONATATE 200 MG/1
200 CAPSULE ORAL 3 TIMES DAILY PRN
Qty: 21 CAPSULE | Refills: 0 | Status: SHIPPED | OUTPATIENT
Start: 2018-03-23 | End: 2019-12-06

## 2018-03-23 RX ORDER — ALBUTEROL SULFATE 90 UG/1
1-2 AEROSOL, METERED RESPIRATORY (INHALATION) EVERY 6 HOURS PRN
Qty: 1 INHALER | Refills: 0 | Status: SHIPPED | OUTPATIENT
Start: 2018-03-23 | End: 2019-12-06

## 2018-03-23 RX ORDER — AZITHROMYCIN 250 MG/1
TABLET, FILM COATED ORAL
Qty: 6 TABLET | Refills: 0 | Status: SHIPPED | OUTPATIENT
Start: 2018-03-23 | End: 2019-12-06

## 2018-03-23 NOTE — MR AVS SNAPSHOT
After Visit Summary   3/23/2018    Dion Tatum    MRN: [id number]           Patient Information     Date Of Birth          1975        Visit Information        Provider Department      3/23/2018 12:00 PM Rashi Muñoz MD [facility]        Today's Diagnoses     JEFF (obstructive sleep apnea)    -  1    Hypersomnia           Follow-ups after your visit        Your next 10 appointments already scheduled     Mar 23, 2018  2:20 PM CDT   SHORT with Kalie Mcpherson PA-C   [facility] ([facility])    [address] [facility] [address] [phone]              Who to contact     If you have questions or need follow up information about today's clinic visit or your schedule please contact [facility] directly at [phone].  Normal or non-critical lab and imaging results will be communicated to you by MyChart, letter or phone within 4 business days after the clinic has received the results. If you do not hear from us within 7 days, please contact the clinic through MyChart or phone. If you have a critical or abnormal lab result, we will notify you by phone as soon as possible.  Submit refill requests through [employer] or call your pharmacy and they will forward the refill request to us. Please allow 3 business days for your refill to be completed.          Additional Information About Your Visit        MyChart Information     [employer] gives you secure access to your electronic health record. If you see a primary care provider, you can also send messages to your care team and make appointments. If you have questions, please call your primary care clinic.  If you do not have a primary care provider, please call [phone] and they will assist you.        Care EveryWhere ID     This is your Care EveryWhere ID. This could be used by other organizations to access your [place] medical records  [id number]          Blood Pressure from Last 3 Encounters:   02/02/18 (!) 148/92   01/30/18 (!) 141/99   01/30/18 (!) 136/96    Weight from Last 3 Encounters:   02/02/18 133 kg (293 lb 3.2 oz)   01/30/18 131.4 kg (289 lb 9.6 oz)   01/30/18 131.4 kg (289 lb 9.6 oz)              We Performed the Following     Comprehensive DME        Primary Care Provider Office Phone # Fax #    Briana Rivera [phone] [id number]  [facility] [address]        Equal Access to Services     [facility]: Hadii nikia collazo hadasho Somaggie, waaxda luqadaha, qaybta kaalmada miryajose daniel, papo weiner . So [facility] [phone].    ATENCIÓN: Si habla español, tiene a zamudio disposición servicios gratuitos de asistencia lingüística. [facility] [phone].    We comply with applicable federal civil rights laws and [place] laws. We do not discriminate on the basis of race, color, national origin, age, disability, sex, sexual orientation, or gender identity.            Thank you!     Thank you for choosing [facility]  for your care. Our goal is always to provide you with excellent care. Hearing back from our patients is one way we can continue to improve our services. Please take a few minutes to complete the written survey that you may receive in the mail after your visit with us. Thank you!             Your Updated Medication List - Protect others around you: Learn how to safely use, store and throw away your medicines at www.disposemymeds.org.          This list is accurate as of 3/23/18 12:49 PM.  Always use your most recent med list.                   Brand Name Dispense Instructions for use Diagnosis    fish oil-omega-3 fatty acids 1000 MG capsule      Take 2 g by mouth daily.        fluticasone 50 MCG/ACT spray    FLONASE    3 Bottle    Spray 1-2 sprays into both nostrils daily    Nasal congestion       FRUIT & VEGETABLE DAILY PO      Take  by mouth.

## 2018-03-23 NOTE — PROGRESS NOTES
"Dion Tatum is a 42 year old male who is being evaluated via a telephone visit.      The patient has been notified of following:     \"This telephone visit will be conducted via a call between you and your physician/provider. We have found that certain health care needs can be provided without the need for a physical exam.  This service lets us provide the care you need with a short phone conversation.  If a prescription is necessary we can send it directly to your pharmacy.  If lab work is needed we can place an order for that and you can then stop by our lab to have the test done at a later time.    We will bill your insurance company for this service.  Please check with your medical insurance if this type of visit is covered. You may be responsible for the cost of this type of visit if insurance coverage is denied.  The typical cost is $30 (10min), $59 (11-20min) and $85 (21-30min).  Most often these visits are shorter than 10 minutes.    If during the course of the call the physician/provider feels a telephone visit is not appropriate, you will not be charged for this service.\"       Consent has been obtained for this service by care team member: yes.   See the scanned image in the medical record.    Dion Tatum complains of  Sleep Problem (Discuss sleep study)      I have reviewed and updated the patient's Past Medical History, Social History, Family History and Medication List.    ALLERGIES  P[place]illins    Trudy Smalls CMA   (MA signature)    Additional provider notes: Reviewed HST results.  3/15/2018, weight 293 lbs, AHI 14, amber SpO2 ~80%, no significant sleep-associated hypoxemia.    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  - Agreed to start treatment with CPAP auto-titrate 5-15 cm H2O    I have reviewed the note as documented above.  This accurately captures the substance of my " conversation with the patient,  Dion    Total time of call between patient and provider was 15 minutes

## 2018-03-23 NOTE — NURSING NOTE
"Chief Complaint   Patient presents with     Cough       Initial /85  Pulse 88  Temp 98.2  F (36.8  C) (Tympanic)  Ht 6' 2\" (1.88 m)  Wt 286 lb 3.2 oz (129.8 kg)  SpO2 96%  BMI 36.75 kg/m2 Estimated body mass index is 36.75 kg/(m^2) as calculated from the following:    Height as of this encounter: 6' 2\" (1.88 m).    Weight as of this encounter: 286 lb 3.2 oz (129.8 kg).  Medication Reconciliation: complete     Janet Dave MA      "

## 2018-03-23 NOTE — MR AVS SNAPSHOT
After Visit Summary   3/23/2018    Dion Tatum    MRN: [id number]           Patient Information     Date Of Birth          1975        Visit Information        Provider Department      3/23/2018 2:20 PM Kalie Mcpherson PA-C [facility]        Today's Diagnoses     Acute bronchitis with symptoms > 10 days    -  1       Follow-ups after your visit        Your next 10 appointments already scheduled     Mar 26, 2018  3:00 PM CDT   PAP SETUP with CL SC DME   [facility] ([facility])    [address]  [facility] [address]   [phone]              Who to contact     Normal or non-critical lab and imaging results will be communicated to you by Tingzhart, letter or phone within 4 business days after the clinic has received the results. If you do not hear from us within 7 days, please contact the clinic through Tingzhart or phone. If you have a critical or abnormal lab result, we will notify you by phone as soon as possible.  Submit refill requests through [employer] or call your pharmacy and they will forward the refill request to us. Please allow 3 business days for your refill to be completed.          If you need to speak with a  for additional information , please call: [phone]           Additional Information About Your Visit        [employer] Information     [employer] gives you secure access to your electronic health record. If you see a primary care provider, you can also send messages to your care team and make appointments. If you have questions, please call your primary care clinic.  If you do not have a primary care provider, please call [phone] and they will assist you.        Care EveryWhere ID     This is your Care EveryWhere ID. This could be used by other organizations to access your [place] medical records  [id number]        Your Vitals Were     Pulse Temperature Height Pulse Oximetry BMI (Body  "Mass Index)       88 98.2  F (36.8  C) (Tympanic) 6' 2\" (1.88 m) 96% 36.75 kg/m2        Blood Pressure from Last 3 Encounters:   03/23/18 117/85   02/02/18 (!) 148/92   01/30/18 (!) 141/99    Weight from Last 3 Encounters:   03/23/18 286 lb 3.2 oz (129.8 kg)   02/02/18 293 lb 3.2 oz (133 kg)   01/30/18 289 lb 9.6 oz (131.4 kg)              Today, you had the following     No orders found for display         Today's Medication Changes          These changes are accurate as of 3/23/18  2:46 PM.  If you have any questions, ask your nurse or doctor.               Start taking these medicines.        Dose/Directions    albuterol 108 (90 BASE) MCG/ACT Inhaler   Commonly known as:  PROAIR HFA/PROVENTIL HFA/VENTOLIN HFA   Used for:  Acute bronchitis with symptoms > 10 days   Started by:  Kalie Mcpherson PA-C        Dose:  1-2 puff   Inhale 1-2 puffs into the lungs every 6 hours as needed for shortness of breath / dyspnea or wheezing   Quantity:  1 Inhaler   Refills:  0       azithromycin 250 MG tablet   Commonly known as:  ZITHROMAX   Used for:  Acute bronchitis with symptoms > 10 days   Started by:  Kalie Mcpherson PA-C        Take 2 tabs on day 1, then 1 tab on days 2-5   Quantity:  6 tablet   Refills:  0       benzonatate 200 MG capsule   Commonly known as:  TESSALON   Used for:  Acute bronchitis with symptoms > 10 days   Started by:  Kalie Mcpherson PA-C        Dose:  200 mg   Take 1 capsule (200 mg) by mouth 3 times daily as needed for cough   Quantity:  21 capsule   Refills:  0            Where to get your medicines      These medications were sent to [facility] [address] [place], [place] - [address] [facility]  [address] [facility] [address]     Phone:  [phone]     albuterol 108 (90 BASE) MCG/ACT Inhaler    azithromycin 250 MG tablet    benzonatate 200 MG capsule                Primary Care Provider Office Phone # Fax #    Briana Rivera [phone] [address] [facility] [address]        Equal Access to Services     [facility]OR[facility] : Hadii nikia clolazo ronaldo Summers, wapilloda luqadaha, qaybta kaalmajose daniel cano, papo rickettsjonosherwin harman. So [facility] [phone].    ATENCIÓN: Si habla español, tiene a zamudio disposición servicios gratuitos de asistencia lingüística. [facility] [phone].    We comply with applicable federal civil rights laws and [place] laws. We do not discriminate on the basis of race, color, national origin, age, disability, sex, sexual orientation, or gender identity.            Thank you!     Thank you for choosing [facility]  for your care. Our goal is always to provide you with excellent care. Hearing back from our patients is one way we can continue to improve our services. Please take a few minutes to complete the written survey that you may receive in the mail after your visit with us. Thank you!             Your Updated Medication List - Protect others around you: Learn how to safely use, store and throw away your medicines at www.disposemymeds.org.          This list is accurate as of 3/23/18  2:46 PM.  Always use your most recent med list.                   Brand Name Dispense Instructions for use Diagnosis    albuterol 108 (90 BASE) MCG/ACT Inhaler    PROAIR HFA/PROVENTIL HFA/VENTOLIN HFA    1 Inhaler    Inhale 1-2 puffs into the lungs every 6 hours as needed for shortness of breath / dyspnea or wheezing    Acute bronchitis with symptoms > 10 days       azithromycin 250 MG tablet    ZITHROMAX    6 tablet    Take 2 tabs on day 1, then 1 tab on days 2-5    Acute bronchitis with symptoms > 10 days       benzonatate 200 MG capsule    TESSALON    21 capsule    Take 1 capsule (200 mg) by mouth 3 times daily as needed for cough    Acute bronchitis with symptoms > 10 days       fish oil-omega-3 fatty acids 1000 MG capsule      Take 2 g by mouth daily.        fluticasone 50 MCG/ACT spray    FLONASE    3 Bottle    Spray 1-2 sprays  into both nostrils daily    Nasal congestion       FRUIT & VEGETABLE DAILY PO      Take  by mouth.

## 2018-03-23 NOTE — PROGRESS NOTES
SUBJECTIVE:   Dion Tatum is a 42 year old male who presents to clinic today for the following health issues:      ENT Symptoms             Symptoms: cc Present Absent Comment   Fever/Chills  x  Thursday until Sunday night    Fatigue  x     Muscle Aches   x    Eye Irritation   x    Sneezing   x    Nasal Corbin/Drg  x     Sinus Pressure/Pain  x     Loss of smell   x    Dental pain   x    Sore Throat   x    Swollen Glands   x    Ear Pain/Fullness   x    Cough  x     Wheeze  x     Chest Pain  x  Due to coughing    Shortness of breath   x    Rash   x    Other   x      Symptom duration:  1 month    Symptom severity:  moderate   Treatments tried:  Everything    Contacts:  wife and daughter        Last week temp was 102 degrees.  Feels symptoms have now settled in chest.   Wheezing at times, some shortness of breath.       Problem list and histories reviewed & adjusted, as indicated.  Additional history: as documented    Patient Active Problem List   Diagnosis     CARDIOVASCULAR SCREENING; LDL GOAL LESS THAN 160     Chronic fatigue     Obesity     Elevated blood pressure     Snoring     Generalized anxiety disorder     Shoulder joint pain     Atypical chest pain     Obesity (BMI 30-39.9)     Past Surgical History:   Procedure Laterality Date     NO HISTORY OF SURGERY       [facility]         Social History   Substance Use Topics     Smoking status: Never Smoker     Smokeless tobacco: Never Used     Alcohol use 0.0 oz/week      Comment: occasional     Family History   Problem Relation Age of Onset     Adopted: Yes     Unknown/Adopted Mother      Unknown/Adopted Father          Current Outpatient Prescriptions   Medication Sig Dispense Refill     albuterol (PROAIR HFA/PROVENTIL HFA/VENTOLIN HFA) 108 (90 BASE) MCG/ACT Inhaler Inhale 1-2 puffs into the lungs every 6 hours as needed for shortness of breath / dyspnea or wheezing 1 Inhaler 0     azithromycin (ZITHROMAX) 250 MG tablet Take 2 tabs on day 1, then 1 tab  "on days 2-5 6 tablet 0     benzonatate (TESSALON) 200 MG capsule Take 1 capsule (200 mg) by mouth 3 times daily as needed for cough 21 capsule 0     fish oil-omega-3 fatty acids (FISH OIL) 1000 MG capsule Take 2 g by mouth daily.       fluticasone (FLONASE) 50 MCG/ACT spray Spray 1-2 sprays into both nostrils daily (Patient not taking: Reported on 3/23/2018) 3 Bottle 1     Nutritional Supplements (FRUIT & VEGETABLE DAILY PO) Take  by mouth.       BP Readings from Last 3 Encounters:   03/23/18 117/85   02/02/18 (!) 148/92   01/30/18 (!) 141/99    Wt Readings from Last 3 Encounters:   03/23/18 286 lb 3.2 oz (129.8 kg)   02/02/18 293 lb 3.2 oz (133 kg)   01/30/18 289 lb 9.6 oz (131.4 kg)                    Reviewed and updated as needed this visit by clinical staff       Reviewed and updated as needed this visit by Provider         ROS:  Constitutional, HEENT, cardiovascular, pulmonary, GI, , musculoskeletal, neuro, skin, endocrine and psych systems are negative, except as otherwise noted.    OBJECTIVE:     /85  Pulse 88  Temp 98.2  F (36.8  C) (Tympanic)  Ht 6' 2\" (1.88 m)  Wt 286 lb 3.2 oz (129.8 kg)  SpO2 96%  BMI 36.75 kg/m2  Body mass index is 36.75 kg/(m^2).  GENERAL: healthy, alert and no distress  EYES: Eyes grossly normal to inspection, PERRL and conjunctivae and sclerae normal  HENT: ear canals and TM's normal, nose and mouth without ulcers or lesions  NECK: no adenopathy, no asymmetry, masses, or scars and thyroid normal to palpation  RESP: lungs clear to auscultation - no rales, rhonchi or wheezes  CV: regular rate and rhythm, normal S1 S2, no S3 or S4, no murmur, click or rub, no peripheral edema and peripheral pulses strong  ABDOMEN: soft, nontender, no hepatosplenomegaly, no masses and bowel sounds normal  MS: no gross musculoskeletal defects noted, no edema    Diagnostic Test Results:  none     ASSESSMENT/PLAN:         1. Acute bronchitis with symptoms > 10 days  BRONCHITIS    * " Antibiotics indicated, see orders.  * I discussed the pathophysiology of bronchitis.  I reviewed the risks and benefits of various over the counter and prescription medications.  Additionally, we reviewed the infectious nature of this condition and techniques to minimize transmission and future infections.    Patient advised to follow up if symptoms worsen or fail to improve as anticipated.     - albuterol (PROAIR HFA/PROVENTIL HFA/VENTOLIN HFA) 108 (90 BASE) MCG/ACT Inhaler; Inhale 1-2 puffs into the lungs every 6 hours as needed for shortness of breath / dyspnea or wheezing  Dispense: 1 Inhaler; Refill: 0  - azithromycin (ZITHROMAX) 250 MG tablet; Take 2 tabs on day 1, then 1 tab on days 2-5  Dispense: 6 tablet; Refill: 0  - benzonatate (TESSALON) 200 MG capsule; Take 1 capsule (200 mg) by mouth 3 times daily as needed for cough  Dispense: 21 capsule; Refill: 0        Kalie Mcpherson PA-C  [facility]

## 2018-04-04 ENCOUNTER — DOCUMENTATION ONLY (OUTPATIENT)
Dept: SLEEP MEDICINE | Facility: CLINIC | Age: 43
End: 2018-04-04
Payer: COMMERCIAL

## 2018-04-04 NOTE — PROGRESS NOTES
Patient was offered choice of vendor and chose [facility].  Patient Dion Tatum was set up at [facility]  on April 4, 2018. Patient received a [employer] AirSense 10 Auto. Pressures were set at 5-15 cm H2O.   Patient s ramp is 5 cm H2O for Auto and FLEX/EPR is EPR, 2.  Patient received a [employer] Mask name: AIRFIT P10  Pillow mask Size Medium, heated tubing and heated humidifier.  Patient is enrolled in the STM Program and does not need to meet compliance.    Dinorah Matamoros

## 2018-04-09 ENCOUNTER — DOCUMENTATION ONLY (OUTPATIENT)
Dept: SLEEP MEDICINE | Facility: CLINIC | Age: 43
End: 2018-04-09

## 2018-04-09 NOTE — PROGRESS NOTES
3 DAY STM VISIT    Patient contacted for 3 day STM visit  Message left for patient to return call     Device type: Auto-CPAP  PAP settings from order::  CPAP min 5 cm  H20       CPAP max 15 cm  H20  Device settings from machine      Min CPAP 5.0            Max CPAP 15.0      Assessment: Nightly usage, most nights over four hours   Action plan: Pt to have f/u 14 day STM visit.  Diagnostic AHI:  14.0

## 2018-04-19 ENCOUNTER — DOCUMENTATION ONLY (OUTPATIENT)
Dept: SLEEP MEDICINE | Facility: CLINIC | Age: 43
End: 2018-04-19
Payer: COMMERCIAL

## 2018-04-19 NOTE — PROGRESS NOTES
14 DAY STM VISIT    Diagnostic AHI:  14.0    Message left for patient to return call     Assessment: Pt meeting objective benchmarks.     Action plan: Waiting for patient to return call and pt to have 30 day STM visit.    Device type: Auto-CPAP  PAP settings:  CPAP min 5 cm  H20      CPAP max 15 cm  H20     95th% pressure 8.7 cm     Objective measures: 14 day rolling measures      Compliance  76 %      Leak  7 lpm  last  upload      AHI 1.43   last  upload      Average number of minutes 344     Average hours of usage 5.7          Objective measure goal  Compliance   Goal >70%  Leak   Goal < 24 lpm  AHI  Goal < 5  Usage  Goal >240

## 2018-05-07 ENCOUNTER — DOCUMENTATION ONLY (OUTPATIENT)
Dept: SLEEP MEDICINE | Facility: CLINIC | Age: 43
End: 2018-05-07
Payer: COMMERCIAL

## 2018-05-07 NOTE — PROGRESS NOTES
30 DAY [facility] VISIT    Diagnostic AHI:  14.0    Message left for patient to return call     Assessment: Pt meeting objective benchmarks.     Action plan: Waiting for patient to return call.   Patient does not have a follow up visit.   Device type: Auto-CPAP  PAP settings: CPAP min 5 cm  H20     CPAP max 15 cm  H20    95th% pressure 9.2 cm  H20   Objective measures: 14 day rolling measures      Compliance  64 %      Leak  12.83 lpm  last  upload      AHI 1.98   last  upload      Average number of minutes 291      Objective measure goal  Compliance   Goal >70%  Leak   Goal < 24 lpm  AHI  Goal < 5  Usage  Goal >240

## 2018-10-02 ENCOUNTER — DOCUMENTATION ONLY (OUTPATIENT)
Dept: SLEEP MEDICINE | Facility: CLINIC | Age: 43
End: 2018-10-02
Payer: COMMERCIAL

## 2018-10-02 NOTE — PROGRESS NOTES
6 month [facility] Recheck Visit     Diagnostic AHI:  14.0 HST    Data only recheck     Assessment: Pt not meeting objective benchmarks for compliance. Patient compliance is 66% the last 180 days. Patient uses his machine most nights some nights less than 4 hours.   Action plan:   Pt to follow up per provider request (1-2 yrs)       Device type: Auto-CPAP  PAP settings: CPAP min 5.0 cm  H20     CPAP max 15.0 cm  H20    95th% pressure 8.3 cm  H20   Objective measures: 14 day rolling measures      Compliance  35 %      Leak  37.44 lpm  last  upload      AHI 5.5   last  upload      Average number of minutes 172      Objective measure goal  Compliance   Goal >70%  Leak   Goal < 24 lpm  AHI  Goal < 5  Usage  Goal >240

## 2018-10-30 ENCOUNTER — TELEPHONE (OUTPATIENT)
Dept: SLEEP MEDICINE | Facility: CLINIC | Age: 43
End: 2018-10-30

## 2018-10-30 DIAGNOSIS — G47.33 OSA (OBSTRUCTIVE SLEEP APNEA): Primary | ICD-10-CM

## 2018-10-30 DIAGNOSIS — G47.10 HYPERSOMNIA: ICD-10-CM

## 2018-10-31 ENCOUNTER — TELEPHONE (OUTPATIENT)
Dept: FAMILY MEDICINE | Facility: CLINIC | Age: 43
End: 2018-10-31

## 2018-10-31 DIAGNOSIS — Z91.030 ALLERGIC TO BEES: Primary | ICD-10-CM

## 2018-10-31 NOTE — TELEPHONE ENCOUNTER
Reason for call:  Patient reporting a symptom    Symptom or request: Dion was stung by beer back in College days and had a pretty severe reaction.  He used to carry epi pens with him at all times, but now has none.  He was last seen by Dr. Cervantes on 1/30/18 for yearly PE.  I did not see that his epi pen or Bee Sting was on allergy list or medication list.  I did advise him that since they are not on any lists, he probably should be seen to discuss with provider.  He doesn't want to do that as he's not due for yearly until 1/19 and has a high deductible on his insurance.    He wanted me to ask provider if they could fill epi pen anyway.  I told him I'd forward to you.  Please review and advise. Thank you..Lesly Alba    Duration (how long have symptoms been present): on going    Have you been treated for this before? Yes - years ago    Phone Number patient can be reached at:  Cell number on file:    Telephone Information:   Mobile [phone]       Best Time:  Any time    Can we leave a detailed message on this number:  YES    Call taken on 10/31/2018 at 9:37 AM by Lesly Alba

## 2018-11-02 RX ORDER — EPINEPHRINE 0.3 MG/.3ML
0.3 INJECTION SUBCUTANEOUS PRN
Qty: 0.6 ML | Refills: 1 | Status: SHIPPED | OUTPATIENT
Start: 2018-11-02

## 2018-11-09 NOTE — TELEPHONE ENCOUNTER
Given the finding of mild JEFF, I would be comfortable with an oral appliance.  
Patient called and left a voice mail that he is really struggling with this c-pap. Wondering if you have other options. Maybe the oral appliance. Please call with recommendations.   AHI was 14  
Please place referral for oral appliance as recommended.    I plan to contact patient when complete.   
Referral mailed to home address listed in chart. Attempted to contact Dion. Left message on answering machine.   
within normal limits

## 2018-11-16 ENCOUNTER — TELEPHONE (OUTPATIENT)
Dept: SLEEP MEDICINE | Facility: CLINIC | Age: 43
End: 2018-11-16

## 2018-11-16 NOTE — TELEPHONE ENCOUNTER
SUBJECTIVE:  Chief Complaint   Patient presents with     Sleep Problem     request for referral and records to be faxed to [facility] at [phone]      OBJECTIVE:  Incoming voicemail received by Dion's spouse Trudy requesting study and sleep records be faxed to [facility] in [place] at fax number: [phone]. She was also inquiring on how to return and possibly get a refund for the c pap machine.     ASSESSMENT/PLAN:  Attempted to contact Trudy. Left message on answering machine letting them know that study, referral, and insurance demographics were faxed to number provided. Also to contact [facility] or the durable medical equipment office that they received the machine from to get information regarding return and refund of machine.

## 2018-11-16 NOTE — TELEPHONE ENCOUNTER
Dion's wife called for sleep records for his upcoming dental appt.  Pt was directed to medical records.  She also had questions regarding the CPAP device  She was directed to [facility]

## 2018-11-26 ENCOUNTER — TRANSFERRED RECORDS (OUTPATIENT)
Dept: HEALTH INFORMATION MANAGEMENT | Facility: CLINIC | Age: 43
End: 2018-11-26

## 2019-12-06 ENCOUNTER — OFFICE VISIT (OUTPATIENT)
Dept: FAMILY MEDICINE | Facility: CLINIC | Age: 44
End: 2019-12-06
Payer: COMMERCIAL

## 2019-12-06 VITALS
RESPIRATION RATE: 16 BRPM | SYSTOLIC BLOOD PRESSURE: 130 MMHG | BODY MASS INDEX: 39.63 KG/M2 | WEIGHT: 299 LBS | TEMPERATURE: 97.1 F | HEIGHT: 73 IN | HEART RATE: 83 BPM | DIASTOLIC BLOOD PRESSURE: 89 MMHG

## 2019-12-06 DIAGNOSIS — Z00.01 ENCOUNTER FOR ROUTINE ADULT MEDICAL EXAM WITH ABNORMAL FINDINGS: Primary | ICD-10-CM

## 2019-12-06 DIAGNOSIS — E66.9 OBESITY (BMI 30-39.9): ICD-10-CM

## 2019-12-06 DIAGNOSIS — G47.33 OSA (OBSTRUCTIVE SLEEP APNEA): ICD-10-CM

## 2019-12-06 DIAGNOSIS — Z11.3 SCREEN FOR STD (SEXUALLY TRANSMITTED DISEASE): ICD-10-CM

## 2019-12-06 DIAGNOSIS — F41.1 GENERALIZED ANXIETY DISORDER: ICD-10-CM

## 2019-12-06 DIAGNOSIS — N50.89 TESTICULAR LUMP: ICD-10-CM

## 2019-12-06 DIAGNOSIS — R03.0 ELEVATED BLOOD PRESSURE READING WITHOUT DIAGNOSIS OF HYPERTENSION: ICD-10-CM

## 2019-12-06 DIAGNOSIS — Z13.1 SCREENING FOR DIABETES MELLITUS: ICD-10-CM

## 2019-12-06 PROBLEM — E66.01 MORBID OBESITY (H): Status: RESOLVED | Noted: 2019-12-06 | Resolved: 2019-12-06

## 2019-12-06 PROBLEM — E66.01 MORBID OBESITY (H): Status: ACTIVE | Noted: 2019-12-06

## 2019-12-06 LAB
CHOLEST SERPL-MCNC: 171 MG/DL
GLUCOSE SERPL-MCNC: 99 MG/DL (ref 70–99)
HDLC SERPL-MCNC: 33 MG/DL
LDLC SERPL CALC-MCNC: 70 MG/DL
NONHDLC SERPL-MCNC: 138 MG/DL
TRIGL SERPL-MCNC: 340 MG/DL

## 2019-12-06 PROCEDURE — 99396 PREV VISIT EST AGE 40-64: CPT | Performed by: FAMILY MEDICINE

## 2019-12-06 PROCEDURE — 87389 HIV-1 AG W/HIV-1&-2 AB AG IA: CPT | Performed by: FAMILY MEDICINE

## 2019-12-06 PROCEDURE — 80061 LIPID PANEL: CPT | Performed by: FAMILY MEDICINE

## 2019-12-06 PROCEDURE — 82947 ASSAY GLUCOSE BLOOD QUANT: CPT | Performed by: FAMILY MEDICINE

## 2019-12-06 PROCEDURE — 36415 COLL VENOUS BLD VENIPUNCTURE: CPT | Performed by: FAMILY MEDICINE

## 2019-12-06 PROCEDURE — 99213 OFFICE O/P EST LOW 20 MIN: CPT | Mod: 25 | Performed by: FAMILY MEDICINE

## 2019-12-06 ASSESSMENT — MIFFLIN-ST. JEOR: SCORE: 2295.14

## 2019-12-06 NOTE — PROGRESS NOTES
3  SUBJECTIVE:   CC: Dion Tatum is an 44 year old male who presents for preventive health visit.     Healthy Habits:    Do you get at least three servings of calcium containing foods daily (dairy, green leafy vegetables, etc.)? yes    Amount of exercise or daily activities, outside of work: 1 day(s) per week - playing with kids        Problems taking medications regularly No    Medication side effects: No    Have you had an eye exam in the past two years? yes    Do you see a dentist twice per year? yes    Do you have sleep apnea, excessive snoring or daytime drowsiness?yes- uses CPAP     Hypertension   BP Readings from Last 6 Encounters:   12/06/19 130/89   03/23/18 117/85   02/02/18 (!) 148/92   01/30/18 (!) 141/99   01/30/18 (!) 136/96   01/11/17 130/89       Right testicular lump  Not painful  Not enlarging  Found it a couple months ago       Lipids  Reviewed [place] data  Discussed dietary changes he could make     The 10-year ASCVD risk score (Leticia GOTTLIEB Jr., et al., 2013) is: 3.3%    Values used to calculate the score:      Age: 44 years      Sex: Male      Is Non- : No      Diabetic: No      Tobacco smoker: No      Systolic Blood Pressure: 130 mmHg      Is BP treated: No      HDL Cholesterol: 29 mg/dL      Total Cholesterol: 177 mg/dL      Weight   Body mass index is 39.45 kg/m .   He wants to lose weight but doesn't have motivation to work out   Very busy       Today's PHQ-2 Score: 0  PHQ-2 ( 1999 Pfizer) 12/6/2019 1/30/2018   Q1: Little interest or pleasure in doing things 0 0   Q2: Feeling down, depressed or hopeless 0 0   PHQ-2 Score 0 0   Q1: Little interest or pleasure in doing things - -   Q2: Feeling down, depressed or hopeless - -   PHQ-2 Score - -       Abuse: Current or Past(Physical, Sexual or Emotional)- No  Do you feel safe in your environment? Yes      Social History     Tobacco Use     Smoking status: Never Smoker     Smokeless tobacco: Never Used   Substance  "Use Topics     Alcohol use: Yes     Alcohol/week: 0.0 standard drinks     Comment: occasional     If you drink alcohol do you typically have >3 drinks per day or >7 drinks per week? No                      Last PSA: No results found for: PSA    Reviewed orders with patient. Reviewed health maintenance and updated orders accordingly - Yes  Labs reviewed in EPIC    Reviewed and updated as needed this visit by clinical staff       Reviewed and updated as needed this visit by Provider            ROS:  CONSTITUTIONAL: NEGATIVE for fever, chills, change in weight  INTEGUMENTARY/SKIN: NEGATIVE for worrisome rashes, moles or lesions  EYES: NEGATIVE for vision changes or irritation  ENT: NEGATIVE for ear, mouth and throat problems  RESP: NEGATIVE for significant cough or SOB  CV: NEGATIVE for chest pain, palpitations or peripheral edema  GI: NEGATIVE for nausea, abdominal pain, heartburn, or change in bowel habits   male: negative for dysuria, hematuria, decreased urinary stream, erectile dysfunction, urethral discharge   male: testicular lump  MUSCULOSKELETAL: NEGATIVE for significant arthralgias or myalgia  NEURO: NEGATIVE for weakness, dizziness or paresthesias  PSYCHIATRIC: NEGATIVE for changes in mood or affect    OBJECTIVE:   /89   Pulse 83   Temp 97.1  F (36.2  C) (Tympanic)   Resp 16   Ht 1.854 m (6' 1\")   Wt 135.6 kg (299 lb)   BMI 39.45 kg/m     BP Readings from Last 6 Encounters:   12/06/19 130/89   03/23/18 117/85   02/02/18 (!) 148/92   01/30/18 (!) 141/99   01/30/18 (!) 136/96   01/11/17 130/89       EXAM:  GENERAL: healthy, alert and no distress  EYES: Eyes grossly normal to inspection, PERRL and conjunctivae and sclerae normal  HENT: ear canals and TM's normal, nose and mouth without ulcers or lesions  NECK: no adenopathy, no asymmetry, masses, or scars and thyroid normal to palpation  RESP: lungs clear to auscultation - no rales, rhonchi or wheezes  CV: regular rate and rhythm, normal S1 S2, " no S3 or S4, no murmur, click or rub, no peripheral edema and peripheral pulses strong  ABDOMEN: soft, nontender, no hepatosplenomegaly, no masses and bowel sounds normal   (male):  Smooth lump in the posterior aspect of the right testicle. Not painful, mobile. Soft.  Left testicle is normal. Normal penile exam  MS: no gross musculoskeletal defects noted, no edema  SKIN: no suspicious lesions or rashes  NEURO: Normal strength and tone, mentation intact and speech normal  PSYCH: mentation appears normal, affect normal/bright      ASSESSMENT/PLAN:     (Z00.01) Encounter for routine adult medical exam with abnormal findings  (primary encounter diagnosis)  Comment: We discussed self testicular exams, exercise 30mins/day, and calcium with vitamin D at 1200mg/day, preferably from dietary sources.  Diet, Weight loss, and Exercise were discussed as well.     (G47.33) JEFF (obstructive sleep apnea)  Comment: using cpap. Feeling more rested   Plan:       (R03.0) Elevated blood pressure reading without diagnosis of hypertension  Comment: Discussed hypertension in detail including JNC VII guidelines for blood pressure goals. Discussed indication for treatment and treatment options. Discussed the importance for aggressive management of HTN to prevent vascular complications later. Recommended lower fat, lower carbohydrate, and lower sodium (<2000 mg)diet. Discussed required intervals for follow up on HTN, lab studies, and the need to aggresive management of other cardiac disease risk factors. Recommended patient follow their blood pressures outside the clinic to ensure that BPs are remaining within guidelines, and to contact me if the readings are not within guidelines so we can adjust treatment as needed      He wants to work on lifestyle changes first and if those don't work ,we discussed adding lisinopril at 10 or 20mg/day to control blood pressure . He will send me a my chart in a month or so        (F41.1) Generalized  "anxiety disorder  Comment: stable   Plan:     (E66.9) Obesity (BMI 30-39.9)  Comment: Discussed increasing fruits/vegies/fiber/water. Three meals a day - with reasonable portion sizes. Do not eat within 3 hours of bed. Increase activity to at least 30 minutes/day. Weigh weekly, not daily.      (N50.89) Testicular lump  Comment: discussed. Ultrasound ordered   Plan: Lipid panel reflex to direct LDL Fasting, US         Testicular and Scrotum            (Z11.3) Screen for STD (sexually transmitted disease)  Comment: discussed   Plan: HIV Antigen Antibody Combo            (Z13.1) Screening for diabetes mellitus  Comment:   Plan: Glucose            COUNSELING:  Reviewed preventive health counseling, as reflected in patient instructions       Regular exercise       Healthy diet/nutrition    Estimated body mass index is 36.75 kg/m  as calculated from the following:    Height as of 3/23/18: 1.88 m (6' 2\").    Weight as of 3/23/18: 129.8 kg (286 lb 3.2 oz).    Weight management plan: Discussed healthy diet and exercise guidelines     reports that he has never smoked. He has never used smokeless tobacco.      Counseling Resources:  ATP IV Guidelines  Pooled Cohorts Equation Calculator  FRAX Risk Assessment  ICSI Preventive Guidelines  Dietary Guidelines for Americans, 2010  USDA's MyPlate  ASA Prophylaxis  Lung CA Screening    Azalea Cervantes MD  [facility]  "

## 2019-12-07 LAB — HIV 1+2 AB+HIV1 P24 AG SERPL QL IA: NONREACTIVE

## 2020-02-28 DIAGNOSIS — G47.33 OSA (OBSTRUCTIVE SLEEP APNEA): Primary | ICD-10-CM

## 2020-03-02 ENCOUNTER — TELEPHONE (OUTPATIENT)
Dept: FAMILY MEDICINE | Facility: CLINIC | Age: 45
End: 2020-03-02

## 2020-03-02 NOTE — TELEPHONE ENCOUNTER
I always discuss hiv testing with people before I order it.   I did not order any additional std testing. The HIV was a screening test so it should have been covered.       I can't order imaging in a different system. I can order at [facility] or [facility].  If he wants an ultrasound in a different system, he may need to see a primary doctor within that system to get it ordered.     Please call him. You can refer him to [facility] for the first issue, if needed.    GOLDEN Harley MD         Reason for Call: Request for an order or referral:    Order or referral being requested: Patient was seen 12/6/19 for a yearly exam.  He is requesting that his ultrasound referral get changed to [facility] in [place] ph. [phone].  His insurance will cover this location.  He is also asking why he would have had STD and HIV testing done at his appt?  States that he did not request it and there was no need for it.  He does not feel that he should have to pay for those tests.     Date needed: as soon as possible    Has the patient been seen by the PCP for this problem? YES    Additional comments:     Phone number Patient can be reached at:  Home number on file [phone] (home)    Best Time:  Any    Can we leave a detailed message on this number?  YES    Call taken on 3/2/2020 at 8:33 AM by Joan Salinas

## 2020-03-03 NOTE — TELEPHONE ENCOUNTER
Message left on patient's answering machine to call the [facility] RN back.  Jamel Muir RN

## 2020-03-06 ENCOUNTER — TELEPHONE (OUTPATIENT)
Dept: FAMILY MEDICINE | Facility: CLINIC | Age: 45
End: 2020-03-06

## 2020-03-06 NOTE — TELEPHONE ENCOUNTER
Reason for Call:  Other call back    Detailed comments: Trudy is calling to speak with RN re:  Dion.  No other information was given.  Please call and assess. Thank you..Lesly Alba    Phone Number Patient can be reached at: Home number on file [phone] (home)    Best Time: any time    Can we leave a detailed message on this number? YES    Call taken on 3/6/2020 at 11:12 AM by Lesly Alba

## 2020-03-06 NOTE — TELEPHONE ENCOUNTER
Call placed to patient's wife.  She was given permission to discuss orders for US needed within network.  Clarified, [facility] is in patient network, US at [facility] should be in network.  Wife has scheduling number, will verify with insurance.    Wife verbalizes frustration with HIV test completed without patient awareness.  Explained screening, providers response.  Clinic administrator number given to discuss concerns.  Angela Callahan RN

## 2020-03-09 ENCOUNTER — ANCILLARY PROCEDURE (OUTPATIENT)
Dept: ULTRASOUND IMAGING | Facility: CLINIC | Age: 45
End: 2020-03-09
Attending: FAMILY MEDICINE
Payer: COMMERCIAL

## 2020-03-09 DIAGNOSIS — N50.89 TESTICULAR LUMP: ICD-10-CM

## 2020-03-09 PROCEDURE — 76870 US EXAM SCROTUM: CPT

## 2020-03-09 PROCEDURE — 93976 VASCULAR STUDY: CPT

## 2020-12-12 ENCOUNTER — HEALTH MAINTENANCE LETTER (OUTPATIENT)
Age: 45
End: 2020-12-12

## 2021-01-15 ENCOUNTER — HEALTH MAINTENANCE LETTER (OUTPATIENT)
Age: 46
End: 2021-01-15

## 2021-09-26 ENCOUNTER — HEALTH MAINTENANCE LETTER (OUTPATIENT)
Age: 46
End: 2021-09-26

## 2022-01-16 ENCOUNTER — HEALTH MAINTENANCE LETTER (OUTPATIENT)
Age: 47
End: 2022-01-16

## 2022-03-30 NOTE — PATIENT INSTRUCTIONS
Preventive Health Recommendations  Male Ages 40 to 49    Yearly exam:             See your health care provider every year in order to  o   Review health changes.   o   Discuss preventive care.    o   Review your medicines if your doctor has prescribed any.    You should be tested each year for STDs (sexually transmitted diseases) if you re at risk.     Have a cholesterol test every 5 years.     Have a colonoscopy (test for colon cancer) if someone in your family has had colon cancer or polyps before age 50.     After age 45, have a diabetes test (fasting glucose). If you are at risk for diabetes, you should have this test every 3 years.      Talk with your health care provider about whether or not a prostate cancer screening test (PSA) is right for you.    Shots: Get a flu shot each year. Get a tetanus shot every 10 years.     Nutrition:    Eat at least 5 servings of fruits and vegetables daily.     Eat whole-grain bread, whole-wheat pasta and brown rice instead of white grains and rice.     Get adequate Calcium and Vitamin D.     Lifestyle    Exercise for at least 150 minutes a week (30 minutes a day, 5 days a week). This will help you control your weight and prevent disease.     Limit alcohol to one drink per day.     No smoking.     Wear sunscreen to prevent skin cancer.     See your dentist every six months for an exam and cleaning.       Heart Failure

## 2022-07-21 DIAGNOSIS — G47.33 OBSTRUCTIVE SLEEP APNEA (ADULT) (PEDIATRIC): Primary | ICD-10-CM

## 2022-12-22 ENCOUNTER — OFFICE VISIT (OUTPATIENT)
Dept: FAMILY MEDICINE | Facility: CLINIC | Age: 47
End: 2022-12-22
Payer: COMMERCIAL

## 2022-12-22 VITALS
SYSTOLIC BLOOD PRESSURE: 138 MMHG | DIASTOLIC BLOOD PRESSURE: 88 MMHG | OXYGEN SATURATION: 98 % | HEART RATE: 88 BPM | BODY MASS INDEX: 41.54 KG/M2 | HEIGHT: 73 IN | TEMPERATURE: 97.3 F | WEIGHT: 313.4 LBS

## 2022-12-22 DIAGNOSIS — Z12.11 SCREEN FOR COLON CANCER: ICD-10-CM

## 2022-12-22 DIAGNOSIS — Z00.00 ROUTINE GENERAL MEDICAL EXAMINATION AT A HEALTH CARE FACILITY: Primary | ICD-10-CM

## 2022-12-22 DIAGNOSIS — Z13.1 SCREENING FOR DIABETES MELLITUS: ICD-10-CM

## 2022-12-22 DIAGNOSIS — Z13.21 ENCOUNTER FOR VITAMIN DEFICIENCY SCREENING: ICD-10-CM

## 2022-12-22 DIAGNOSIS — Z13.6 CARDIOVASCULAR SCREENING; LDL GOAL LESS THAN 160: ICD-10-CM

## 2022-12-22 DIAGNOSIS — E66.01 MORBID OBESITY (H): ICD-10-CM

## 2022-12-22 DIAGNOSIS — R73.09 ELEVATED GLUCOSE LEVEL: ICD-10-CM

## 2022-12-22 LAB
CHOLEST SERPL-MCNC: 186 MG/DL
DEPRECATED CALCIDIOL+CALCIFEROL SERPL-MC: 26 UG/L (ref 20–75)
FASTING STATUS PATIENT QL REPORTED: YES
GLUCOSE SERPL-MCNC: 196 MG/DL (ref 70–99)
HDLC SERPL-MCNC: 33 MG/DL
LDLC SERPL CALC-MCNC: 81 MG/DL
NONHDLC SERPL-MCNC: 153 MG/DL
TRIGL SERPL-MCNC: 359 MG/DL

## 2022-12-22 PROCEDURE — 82306 VITAMIN D 25 HYDROXY: CPT | Performed by: FAMILY MEDICINE

## 2022-12-22 PROCEDURE — 82947 ASSAY GLUCOSE BLOOD QUANT: CPT | Performed by: FAMILY MEDICINE

## 2022-12-22 PROCEDURE — 80061 LIPID PANEL: CPT | Performed by: FAMILY MEDICINE

## 2022-12-22 PROCEDURE — 36415 COLL VENOUS BLD VENIPUNCTURE: CPT | Performed by: FAMILY MEDICINE

## 2022-12-22 PROCEDURE — 99386 PREV VISIT NEW AGE 40-64: CPT | Mod: 25 | Performed by: FAMILY MEDICINE

## 2022-12-22 PROCEDURE — 90715 TDAP VACCINE 7 YRS/> IM: CPT | Performed by: FAMILY MEDICINE

## 2022-12-22 PROCEDURE — 90471 IMMUNIZATION ADMIN: CPT | Performed by: FAMILY MEDICINE

## 2022-12-22 RX ORDER — MULTIVITAMIN
TABLET ORAL DAILY
COMMUNITY

## 2022-12-22 ASSESSMENT — ENCOUNTER SYMPTOMS
CONSTIPATION: 0
DIARRHEA: 0
NAUSEA: 0
PARESTHESIAS: 0
CHILLS: 0
WEAKNESS: 0
HEADACHES: 0
HEMATURIA: 0
DIZZINESS: 1
HEMATOCHEZIA: 0
DYSURIA: 0
SORE THROAT: 0
COUGH: 0
PALPITATIONS: 0
ARTHRALGIAS: 0
FEVER: 0
HEARTBURN: 0
SHORTNESS OF BREATH: 0
JOINT SWELLING: 0
ABDOMINAL PAIN: 0
FREQUENCY: 0
EYE PAIN: 0
MYALGIAS: 0
NERVOUS/ANXIOUS: 1

## 2022-12-22 NOTE — PROGRESS NOTES
SUBJECTIVE:   CC: Dion is an 47 year old who presents for preventative health visit.     Patient has been advised of split billing requirements and indicates understanding: Yes  Healthy Habits:     Getting at least 3 servings of Calcium per day:  Yes    Bi-annual eye exam:  Yes    Dental care twice a year:  Yes    Sleep apnea or symptoms of sleep apnea:  Sleep apnea    Diet:  Regular (no restrictions)    Frequency of exercise:  1 day/week    Duration of exercise:  Less than 15 minutes    Taking medications regularly:  Yes    Medication side effects:  Not applicable    PHQ-2 Total Score: 0    Additional concerns today:  No    Scheduled visit due to elevated blood pressure at the dentist.  Has a lot of anxiety at the dentist ans was there to have a tooth fixed that had been previously damaged.  Readings were high enough that the dentist was reluctant to proceed with fixing the tooth.  Has had borderline blood pressure sin the past and this was enough to trigger the visit.    Notes that a lot has changed for the worse with health and activity since the pandemic.  Working form home now so less active.  Not exercising at all.  Finds it hard to put limits on his time at work, really struggling with boundaries there.  Has 2 teenagers at home and feels there is a lot of time commitment there too.  Just does not feel like he has the energy to exercise by the end of the day.  Knows it needs to be different but not sure how to change those priorities.  Hoping to get some information today to get that jump started.            Today's PHQ-2 Score:   PHQ-2 ( 1999 Pfizer) 12/22/2022   Q1: Little interest or pleasure in doing things 0   Q2: Feeling down, depressed or hopeless 0   PHQ-2 Score 0   PHQ-2 Total Score (12-17 Years)- Positive if 3 or more points; Administer PHQ-A if positive -   Q1: Little interest or pleasure in doing things Not at all   Q2: Feeling down, depressed or hopeless Not at all   PHQ-2 Score 0       Have you  ever done Advance Care Planning? (For example, a Health Directive, POLST, or a discussion with a medical provider or your loved ones about your wishes):     Social History     Tobacco Use     Smoking status: Never     Smokeless tobacco: Never   Substance Use Topics     Alcohol use: Yes     Alcohol/week: 0.0 standard drinks     Comment: occasional         Alcohol Use 12/22/2022   Prescreen: >3 drinks/day or >7 drinks/week? No   Prescreen: >3 drinks/day or >7 drinks/week? -       Last PSA: No results found for: PSA    Reviewed orders with patient. Reviewed health maintenance and updated orders accordingly - Yes      Reviewed and updated as needed this visit by clinical staff   Tobacco  Allergies  Meds              Reviewed and updated as needed this visit by Provider   Tobacco  Allergies  Meds             Past Medical History:   Diagnosis Date     Chronic fatigue      Hypertension 2015    slightly higher than normal     Obesity       Past Surgical History:   Procedure Laterality Date     NO HISTORY OF SURGERY       DONNA LINK         Review of Systems   Constitutional: Negative for chills and fever.   HENT: Positive for ear pain. Negative for congestion, hearing loss and sore throat.    Eyes: Negative for pain and visual disturbance.   Respiratory: Negative for cough and shortness of breath.    Cardiovascular: Negative for chest pain, palpitations and peripheral edema.   Gastrointestinal: Negative for abdominal pain, constipation, diarrhea, heartburn, hematochezia and nausea.   Genitourinary: Negative for dysuria, frequency, genital sores, hematuria, impotence, penile discharge and urgency.   Musculoskeletal: Negative for arthralgias, joint swelling and myalgias.   Skin: Negative for rash.   Neurological: Positive for dizziness. Negative for weakness, headaches and paresthesias.   Psychiatric/Behavioral: Negative for mood changes. The patient is nervous/anxious.      Ear pain and external ear infection  "with covid in the spring.  Was treated for otitis externa.  Since then pain as resolved but ears feel 'wet\" regularly and itchy intermittently.    Episode of positional vertigo a few times.  Symptoms only when sitting up from laying down.  Vertigo lasts 5 seconds and resolves.  No other movements trigger it.  Not really bothersome      No concerns with anxiety, just stressed in general.          OBJECTIVE:   /88   Pulse 88   Temp 97.3  F (36.3  C) (Tympanic)   Ht 1.854 m (6' 1\")   Wt 142.2 kg (313 lb 6.4 oz)   SpO2 98%   BMI 41.35 kg/m      Physical Exam  GENERAL: healthy, alert and no distress  EYES: Eyes grossly normal to inspection, PERRL and conjunctivae and sclerae normal  HENT: normal cephalic/atraumatic, right ear: occluded with wax and left ear: normal: no effusions, no erythema, normal landmarks, significant but not occlusive cerumen in the canal  NECK: no adenopathy, no asymmetry, masses, or scars and thyroid normal to palpation  RESP: lungs clear to auscultation - no rales, rhonchi or wheezes  CV: regular rate and rhythm, normal S1 S2, no S3 or S4, no murmur, click or rub, no peripheral edema and peripheral pulses strong  MS: no gross musculoskeletal defects noted, no edema  NEURO: Normal strength and tone, mentation intact and speech normal  PSYCH: mentation appears normal, affect normal/bright    Diagnostic Test Results:  Labs reviewed in Epic    ASSESSMENT/PLAN:       ICD-10-CM    1. Routine general medical examination at a health care facility  Z00.00       2. Screen for colon cancer  Z12.11 MATY(EXACT SCIENCES)      3. CARDIOVASCULAR SCREENING; LDL GOAL LESS THAN 160  Z13.6 Lipid panel reflex to direct LDL Fasting     Lipid panel reflex to direct LDL Fasting      4. Screening for diabetes mellitus  Z13.1 Glucose     Glucose      5. Encounter for vitamin deficiency screening  Z13.21 Vitamin D Deficiency     Vitamin D Deficiency      6. Morbid obesity (H)  E66.01           Patient has " been advised of split billing requirements and indicates understanding: Yes      COUNSELING:   Reviewed preventive health counseling, as reflected in patient instructions        He reports that he has never smoked. He has never used smokeless tobacco.            Padmaja Harrington DO  [facility]

## 2022-12-22 NOTE — PATIENT INSTRUCTIONS
"I would recommend checking out the DASH diet online for dietary options to improve blood pressure.  Regular exercise, at least 30 minutes most days of the week, and weight loss will help too.    When checking your own blood pressure at home start by making sure the cuff is for the upper arm, not wrist.  Make sure the cuff is the correct size for your arm.  Consider scheduling a visit with the nurse to check the cuff's accuracy here in clinic    When checking your blood pressure, be sure you are seated with your feet supported for 5-10 minutes before checking.    If you are seeing readings >140/90 please schedule a visit to discuss options.    We also discussed using wax softening drops and bulb irrigation for the ear wax.  Try to avoid using Qtips in the ear.  If this does not resolve the issue please let us know    If the vertigo becomes more of an issue you can check out \"canalith repositioning\" exercises on YouTube    Preventive Health Recommendations  Male Ages 40 to 49    Yearly exam:             See your health care provider every year in order to  o   Review health changes.   o   Discuss preventive care.    o   Review your medicines if your doctor has prescribed any.  You should be tested each year for STDs (sexually transmitted diseases) if you re at risk.   Have a cholesterol test every 5 years.   Have a colonoscopy (test for colon cancer) if someone in your family has had colon cancer or polyps before age 50.   After age 45, have a diabetes test (fasting glucose). If you are at risk for diabetes, you should have this test every 3 years.    Talk with your health care provider about whether or not a prostate cancer screening test (PSA) is right for you.    Shots: Get a flu shot each year. Get a tetanus shot every 10 years.     Nutrition:  Eat at least 5 servings of fruits and vegetables daily.   Eat whole-grain bread, whole-wheat pasta and brown rice instead of white grains and rice.   Get adequate Calcium " and Vitamin D.     Lifestyle  Exercise for at least 150 minutes a week (30 minutes a day, 5 days a week). This will help you control your weight and prevent disease.   Limit alcohol to one drink per day.   No smoking.   Wear sunscreen to prevent skin cancer.   See your dentist every six months for an exam and cleaning.

## 2022-12-22 NOTE — NURSING NOTE
"Initial /88   Pulse 88   Temp 97.3  F (36.3  C) (Tympanic)   Ht 1.854 m (6' 1\")   Wt 142.2 kg (313 lb 6.4 oz)   SpO2 98%   BMI 41.35 kg/m   Estimated body mass index is 41.35 kg/m  as calculated from the following:    Height as of this encounter: 1.854 m (6' 1\").    Weight as of this encounter: 142.2 kg (313 lb 6.4 oz). .      "

## 2022-12-26 ENCOUNTER — TELEPHONE (OUTPATIENT)
Dept: FAMILY MEDICINE | Facility: CLINIC | Age: 47
End: 2022-12-26

## 2022-12-26 ENCOUNTER — LAB (OUTPATIENT)
Dept: LAB | Facility: CLINIC | Age: 47
End: 2022-12-26
Payer: COMMERCIAL

## 2022-12-26 ENCOUNTER — MYC MEDICAL ADVICE (OUTPATIENT)
Dept: FAMILY MEDICINE | Facility: CLINIC | Age: 47
End: 2022-12-26

## 2022-12-26 ENCOUNTER — ALLIED HEALTH/NURSE VISIT (OUTPATIENT)
Dept: FAMILY MEDICINE | Facility: CLINIC | Age: 47
End: 2022-12-26
Payer: COMMERCIAL

## 2022-12-26 DIAGNOSIS — E11.65 TYPE 2 DIABETES MELLITUS WITH HYPERGLYCEMIA, WITHOUT LONG-TERM CURRENT USE OF INSULIN (H): Primary | ICD-10-CM

## 2022-12-26 DIAGNOSIS — R73.09 ELEVATED GLUCOSE LEVEL: ICD-10-CM

## 2022-12-26 DIAGNOSIS — Z13.1 SCREENING FOR DIABETES MELLITUS: Primary | ICD-10-CM

## 2022-12-26 LAB
ALBUMIN SERPL BCG-MCNC: 4.4 G/DL (ref 3.5–5.2)
ALP SERPL-CCNC: 91 U/L (ref 40–129)
ALT SERPL W P-5'-P-CCNC: 76 U/L (ref 10–50)
ANION GAP SERPL CALCULATED.3IONS-SCNC: 10 MMOL/L (ref 7–15)
AST SERPL W P-5'-P-CCNC: 53 U/L (ref 10–50)
BILIRUB SERPL-MCNC: 0.4 MG/DL
BUN SERPL-MCNC: 19.4 MG/DL (ref 6–20)
CALCIUM SERPL-MCNC: 9.9 MG/DL (ref 8.6–10)
CHLORIDE SERPL-SCNC: 102 MMOL/L (ref 98–107)
CREAT SERPL-MCNC: 1.07 MG/DL (ref 0.67–1.17)
DEPRECATED HCO3 PLAS-SCNC: 27 MMOL/L (ref 22–29)
GFR SERPL CREATININE-BSD FRML MDRD: 86 ML/MIN/1.73M2
GLUCOSE SERPL-MCNC: 202 MG/DL (ref 70–99)
HBA1C MFR BLD: 8.9 % (ref 0–5.6)
POTASSIUM SERPL-SCNC: 4.6 MMOL/L (ref 3.4–5.3)
PROT SERPL-MCNC: 7.6 G/DL (ref 6.4–8.3)
SODIUM SERPL-SCNC: 139 MMOL/L (ref 136–145)

## 2022-12-26 PROCEDURE — 80053 COMPREHEN METABOLIC PANEL: CPT

## 2022-12-26 PROCEDURE — 99207 PR NO CHARGE NURSE ONLY: CPT

## 2022-12-26 PROCEDURE — 36415 COLL VENOUS BLD VENIPUNCTURE: CPT

## 2022-12-26 PROCEDURE — 83036 HEMOGLOBIN GLYCOSYLATED A1C: CPT

## 2022-12-26 NOTE — TELEPHONE ENCOUNTER
Forwarding to PCP for review of labs and recommendations please.     Patient calls for review of his lab results from this morning.  He is able to see that his numbers are abnormal and is concerned about this.  It appears patient will likely be diagnosed with diabetes, as his fasting BG and A1C are both elevated within diabetic range.    RN advised patient that his labs will need to be reviewed by PCP, and she will give recommendations for next steps. Patient has anxiety about this and would like to hear from someone soon, as he will be leaving to go out of town in 2 days and would like feedback, Rx if needed, etc. before then. He will still be in the [facility], so RN advised that we can electronically send Rxs out of state as well.   Patient had several questions about elevated blood glucose and A1C, which RN answered to the best of ability. Advised patient to watch his carbohydrate intake for now, avoid sweets and concentrate on lean meats, whole grains, etc., as well as exercise and weight loss.  Understanding voiced, and he will await further communication from PCP/care team regarding results and next steps.     Franchesca Gallo RN  [facility]

## 2022-12-26 NOTE — NURSING NOTE
Dion presented to clinic for a lab draw but had some questions first before he was to get it done. Wanted to make sure enough lab work was being done in case this does not come back as diabetes or even if it does. Reassured him that Dr Harrington has ordered what is necessary and that we will contact him with the results. He expressed understanding and did have the lab work done.    Magi Wilkins RN

## 2022-12-29 ENCOUNTER — TELEPHONE (OUTPATIENT)
Dept: FAMILY MEDICINE | Facility: CLINIC | Age: 47
End: 2022-12-29

## 2022-12-29 NOTE — TELEPHONE ENCOUNTER
Diabetes Education Scheduling Outreach #1:    Call to patient to schedule. Left message with phone number to call to schedule.    Also sent Ringly message for second attempt. Requested patient to call to schedule.    Sarah Samuel OnCall  Diabetes and Nutrition Scheduling

## 2023-01-09 ENCOUNTER — VIRTUAL VISIT (OUTPATIENT)
Dept: FAMILY MEDICINE | Facility: CLINIC | Age: 48
End: 2023-01-09
Payer: COMMERCIAL

## 2023-01-09 DIAGNOSIS — E11.65 TYPE 2 DIABETES MELLITUS WITH HYPERGLYCEMIA, WITHOUT LONG-TERM CURRENT USE OF INSULIN (H): Primary | ICD-10-CM

## 2023-01-09 PROCEDURE — 99214 OFFICE O/P EST MOD 30 MIN: CPT | Mod: 95 | Performed by: FAMILY MEDICINE

## 2023-01-09 RX ORDER — METFORMIN HCL 500 MG
500 TABLET, EXTENDED RELEASE 24 HR ORAL
Qty: 360 TABLET | Refills: 0 | Status: SHIPPED | OUTPATIENT
Start: 2023-01-09 | End: 2023-04-04

## 2023-01-09 RX ORDER — GLUCOSAMINE HCL/CHONDROITIN SU 500-400 MG
CAPSULE ORAL
Qty: 100 EACH | Refills: 3 | Status: SHIPPED | OUTPATIENT
Start: 2023-01-09 | End: 2023-10-06

## 2023-01-09 RX ORDER — LANCETS
EACH MISCELLANEOUS
Qty: 100 EACH | Refills: 6 | Status: SHIPPED | OUTPATIENT
Start: 2023-01-09 | End: 2023-10-06

## 2023-01-09 NOTE — PROGRESS NOTES
Dion is a 47 year old who is being evaluated via a billable video visit.      How would you like to obtain your AVS? MyChart  If the video visit is dropped, the invitation should be resent by:   Will anyone else be joining your video visit? No          A/P:      ICD-10-CM    1. Type 2 diabetes mellitus with hyperglycemia, without long-term current use of insulin (H)  E11.65 metFORMIN (GLUCOPHAGE XR) 500 MG 24 hr tablet     blood glucose monitoring (NO BRAND SPECIFIED) meter device kit     blood glucose (NO BRAND SPECIFIED) test strip     blood glucose calibration (NO BRAND SPECIFIED) solution     thin (NO BRAND SPECIFIED) lancets     alcohol swab prep pads        Reviewed pathophysiology of DM and treatment.  Reviewed diet and exercise with an eye towards sustainability.  Reviewed diabetic ed.  Discussed frequency of A1c testing and possibility of reversing DM.    Reviewed role of medications, specifically metformin.  Reviewed potential side effects.  Prescription sent.     Briefly reviewed lipids, BP and renal protection.  Plan to begin 1 med today as ordered.    Reviewed checking blood sugars and meter and supplies sent.    Plan f/u with diabetic ed and in clinic in 3 months.  All questions answered    36 minutes of 36 minute visit spent reviewing pathophysiology of DM and management as above.    Subjective   Dion is a 47 year old, presenting for the following health issues:  No chief complaint on file.      HPI   New diagnosis of DM just before vacation.  Since the diagnosis has been really working on changing his diet.  Has been doing reduced carbs and tolerating well.  Has lost weight since his visit and is feeling good.    Has questions about management and whether or not this can be reversed.      Review of Systems   Constitutional, HEENT, cardiovascular, pulmonary, gi and gu systems are negative, except as otherwise noted.      Objective           Vitals:  No vitals were obtained today due to virtual  visit.    Physical Exam   GENERAL: Healthy, alert and no distress  EYES: Eyes grossly normal to inspection.  No discharge or erythema, or obvious scleral/conjunctival abnormalities.  RESP: No audible wheeze, cough, or visible cyanosis.  No visible retractions or increased work of breathing.    SKIN: Visible skin clear. No significant rash, abnormal pigmentation or lesions.  NEURO: Cranial nerves grossly intact.  Mentation and speech appropriate for age.  PSYCH: Mentation appears normal, affect normal/bright, judgement and insight intact, normal speech and appearance well-groomed.    EPic reviewed            Video-Visit Details    Type of service:  Video Visit     Originating Location (pt. Location): Home    Distant Location (provider location):  On-site  Platform used for Video Visit: Squabbler

## 2023-01-15 PROBLEM — E11.65 TYPE 2 DIABETES MELLITUS WITH HYPERGLYCEMIA, WITHOUT LONG-TERM CURRENT USE OF INSULIN (H): Status: ACTIVE | Noted: 2023-01-15

## 2023-02-02 ENCOUNTER — MYC MEDICAL ADVICE (OUTPATIENT)
Dept: FAMILY MEDICINE | Facility: CLINIC | Age: 48
End: 2023-02-02
Payer: COMMERCIAL

## 2023-04-04 DIAGNOSIS — E11.65 TYPE 2 DIABETES MELLITUS WITH HYPERGLYCEMIA, WITHOUT LONG-TERM CURRENT USE OF INSULIN (H): ICD-10-CM

## 2023-04-04 RX ORDER — METFORMIN HCL 500 MG
1000 TABLET, EXTENDED RELEASE 24 HR ORAL 2 TIMES DAILY WITH MEALS
Qty: 360 TABLET | Refills: 0 | Status: SHIPPED | OUTPATIENT
Start: 2023-04-04 | End: 2024-02-28

## 2023-04-04 NOTE — TELEPHONE ENCOUNTER
Please see message from pharmacy regarding sig for medication.     Dipika Cruz RN on 4/4/2023 at 10:47 AM

## 2023-04-23 ENCOUNTER — HEALTH MAINTENANCE LETTER (OUTPATIENT)
Age: 48
End: 2023-04-23

## 2023-06-16 ENCOUNTER — HOSPITAL ENCOUNTER (EMERGENCY)
Facility: HOSPITAL | Age: 48
Discharge: HOME | End: 2023-06-16
Attending: EMERGENCY MEDICINE | Admitting: EMERGENCY MEDICINE
Payer: COMMERCIAL

## 2023-06-16 ENCOUNTER — APPOINTMENT (OUTPATIENT)
Dept: RADIOLOGY | Facility: HOSPITAL | Age: 48
End: 2023-06-16
Attending: EMERGENCY MEDICINE
Payer: COMMERCIAL

## 2023-06-16 VITALS
RESPIRATION RATE: 20 BRPM | TEMPERATURE: 97 F | DIASTOLIC BLOOD PRESSURE: 81 MMHG | WEIGHT: 295 LBS | SYSTOLIC BLOOD PRESSURE: 152 MMHG | HEART RATE: 75 BPM | OXYGEN SATURATION: 95 % | BODY MASS INDEX: 39.1 KG/M2 | HEIGHT: 73 IN

## 2023-06-16 DIAGNOSIS — R00.2 PALPITATIONS: ICD-10-CM

## 2023-06-16 LAB
ALBUMIN SERPL BCG-MCNC: 4.2 G/DL (ref 3.5–5.2)
ALP SERPL-CCNC: 92 U/L (ref 40–129)
ALT SERPL W P-5'-P-CCNC: 40 U/L (ref 0–70)
ANION GAP SERPL CALCULATED.3IONS-SCNC: 11 MMOL/L (ref 7–15)
AST SERPL W P-5'-P-CCNC: 30 U/L (ref 0–45)
BASOPHILS # BLD AUTO: 0.1 10E3/UL (ref 0–0.2)
BASOPHILS NFR BLD AUTO: 1 %
BILIRUB DIRECT SERPL-MCNC: <0.2 MG/DL (ref 0–0.3)
BILIRUB SERPL-MCNC: <0.2 MG/DL
BUN SERPL-MCNC: 17.4 MG/DL (ref 6–20)
CALCIUM SERPL-MCNC: 9.4 MG/DL (ref 8.6–10)
CHLORIDE SERPL-SCNC: 100 MMOL/L (ref 98–107)
CREAT SERPL-MCNC: 0.97 MG/DL (ref 0.67–1.17)
DEPRECATED HCO3 PLAS-SCNC: 26 MMOL/L (ref 22–29)
EOSINOPHIL # BLD AUTO: 0.3 10E3/UL (ref 0–0.7)
EOSINOPHIL NFR BLD AUTO: 3 %
ERYTHROCYTE [DISTWIDTH] IN BLOOD BY AUTOMATED COUNT: 12.9 % (ref 10–15)
GFR SERPL CREATININE-BSD FRML MDRD: >90 ML/MIN/1.73M2
GLUCOSE SERPL-MCNC: 119 MG/DL (ref 70–99)
HCT VFR BLD AUTO: 41.7 % (ref 40–53)
HGB BLD-MCNC: 14.5 G/DL (ref 13.3–17.7)
IMM GRANULOCYTES # BLD: 0.1 10E3/UL
IMM GRANULOCYTES NFR BLD: 1 %
LIPASE SERPL-CCNC: 36 U/L (ref 13–60)
LYMPHOCYTES # BLD AUTO: 4.1 10E3/UL (ref 0.8–5.3)
LYMPHOCYTES NFR BLD AUTO: 40 %
MAGNESIUM SERPL-MCNC: 2 MG/DL (ref 1.7–2.3)
MCH RBC QN AUTO: 30 PG (ref 26.5–33)
MCHC RBC AUTO-ENTMCNC: 34.8 G/DL (ref 31.5–36.5)
MCV RBC AUTO: 86 FL (ref 78–100)
MONOCYTES # BLD AUTO: 0.8 10E3/UL (ref 0–1.3)
MONOCYTES NFR BLD AUTO: 8 %
NEUTROPHILS # BLD AUTO: 5 10E3/UL (ref 1.6–8.3)
NEUTROPHILS NFR BLD AUTO: 47 %
NRBC # BLD AUTO: 0 10E3/UL
NRBC BLD AUTO-RTO: 0 /100
PLATELET # BLD AUTO: 194 10E3/UL (ref 150–450)
POTASSIUM SERPL-SCNC: 4.2 MMOL/L (ref 3.4–5.3)
PROT SERPL-MCNC: 7.3 G/DL (ref 6.4–8.3)
RBC # BLD AUTO: 4.83 10E6/UL (ref 4.4–5.9)
SODIUM SERPL-SCNC: 137 MMOL/L (ref 136–145)
TROPONIN T SERPL HS-MCNC: 7 NG/L
TSH SERPL DL<=0.005 MIU/L-ACNC: 3.41 UIU/ML (ref 0.3–4.2)
WBC # BLD AUTO: 10.3 10E3/UL (ref 4–11)

## 2023-06-16 PROCEDURE — 99285 EMERGENCY DEPT VISIT HI MDM: CPT | Mod: 25

## 2023-06-16 PROCEDURE — 83690 ASSAY OF LIPASE: CPT | Performed by: EMERGENCY MEDICINE

## 2023-06-16 PROCEDURE — 71045 X-RAY EXAM CHEST 1 VIEW: CPT

## 2023-06-16 PROCEDURE — 82248 BILIRUBIN DIRECT: CPT | Performed by: EMERGENCY MEDICINE

## 2023-06-16 PROCEDURE — 96360 HYDRATION IV INFUSION INIT: CPT

## 2023-06-16 PROCEDURE — 83735 ASSAY OF MAGNESIUM: CPT | Performed by: EMERGENCY MEDICINE

## 2023-06-16 PROCEDURE — 84484 ASSAY OF TROPONIN QUANT: CPT | Performed by: EMERGENCY MEDICINE

## 2023-06-16 PROCEDURE — 258N000003 HC RX IP 258 OP 636: Performed by: EMERGENCY MEDICINE

## 2023-06-16 PROCEDURE — 36415 COLL VENOUS BLD VENIPUNCTURE: CPT | Performed by: EMERGENCY MEDICINE

## 2023-06-16 PROCEDURE — 93005 ELECTROCARDIOGRAM TRACING: CPT | Performed by: EMERGENCY MEDICINE

## 2023-06-16 PROCEDURE — 85025 COMPLETE CBC W/AUTO DIFF WBC: CPT | Performed by: EMERGENCY MEDICINE

## 2023-06-16 PROCEDURE — 82310 ASSAY OF CALCIUM: CPT | Performed by: EMERGENCY MEDICINE

## 2023-06-16 PROCEDURE — 84443 ASSAY THYROID STIM HORMONE: CPT | Performed by: EMERGENCY MEDICINE

## 2023-06-16 RX ADMIN — SODIUM CHLORIDE, POTASSIUM CHLORIDE, SODIUM LACTATE AND CALCIUM CHLORIDE 1000 ML: 600; 310; 30; 20 INJECTION, SOLUTION INTRAVENOUS at 01:26

## 2023-06-16 ASSESSMENT — ACTIVITIES OF DAILY LIVING (ADL): ADLS_ACUITY_SCORE: 35

## 2023-06-16 NOTE — ED TRIAGE NOTES
"Had a few stressful days dealing with a graduation. Complaint is \"palpitations, like heart pounding out of chest for 4 yours. Had several episodes over last few weeks\". Also states it's \"hard to catch my breath, don't know if this is panic attack\". He ambulated in, denies CP, speaks in full sentences. No cardiac HX. He is adopted. Non smoker. No interventions at home. He has sleep apnea.     Triage Assessment     Row Name 06/16/23 0013       Triage Assessment (Adult)    Airway WDL WDL       Respiratory WDL    Respiratory WDL X       Skin Circulation/Temperature WDL    Skin Circulation/Temperature WDL WDL       Cardiac WDL    Cardiac WDL X       Peripheral/Neurovascular WDL    Peripheral Neurovascular WDL WDL       Cognitive/Neuro/Behavioral WDL    Cognitive/Neuro/Behavioral WDL WDL              "

## 2023-06-16 NOTE — ED PROVIDER NOTES
"EMERGENCY DEPARTMENT ENCOUNTER      NAME: Dion Tatum  AGE: 48 year old male  YOB: 1975  MRN: [id number]  EVALUATION DATE & TIME: 6/16/2023 12:17 AM    PCP: Padmaja Harrington    ED PROVIDER: Marcelino Black M.D.      Chief Complaint   Patient presents with     Palpitations         IMPRESSION  1. Palpitations        PLAN  - close PCP & Cardiology follow up  - discharge to home    ED COURSE & MEDICAL DECISION MAKING    ED Course as of 06/16/23 0224   Fri Jun 16, 2023   0022 48yoM with history of obesity, T2DM, high blood pressure readings, anxiety, atypical chest pain, JEFF presenting for evaluation of palpitations. Reports 10-14 days of intermittent palpitations; were only at night when sitting on the cough or lying down (would improve with standing). Tonight have been ongoing the past couple hours so came to the ED. No chest pain/pressure, pleuritic or exertional symptoms, shortness of breath, nausea, vomiting, cough, fevers, sweats, chills. Notes he had been under more stress recently with a child graduating high school and having a party for this; but this has all passed \"so you would think I would feel better\". Denies any history of similar episodes in the past. Took no meds for pain. Feels ongoing palpitations at this time.    BP 180s/90s on presentation (150s/80s on exam after no interventions) with otherwise normal vitals. Calm on exam with clear lungs, normal work of breathing, no chest wall tenderness, benign abdomen, no peripheral edema or calf tenderness, normal neuro exam.    Patient low risk for PE and PERC negative; PE ruled out at this time. Doubt acute aortic syndrome. Will obtain screening EKG, blood, CXR while giving IVF. Patient comfortable with this plan; no further questions at this time.   0040 CXR independently reviewed & interpreted by me: no acute cardiopulmonary process.   0203 Workup overall reassuring against emergent life-threatening etiology. Blood with no SABAS, no " glaring electrolyte abnormality, normal TSH, no anemia, no leukocytosis, normal bilirubin/LFTs/lipase, high-sensitivity troponin within normal limits. EKG with no ischemic changes or arrhythmia; doubt ACS. CXR clear with no acute cardiopulmonary process.    Patient asymptomatic on recheck with normal vitals. Palpitations resolved with IVF. Ok for outpatient management; may benefit from home cardiac monitor. Patient able to tolerate PO and walk without difficulty. Patient comfortable with discharge at this time. Return precautions and need for PCP & Cardiology follow up discussed and understood. No further questions at the time of discharge.       --------------------------------------------------------------------------------   --------------------------------------------------------------------------------     12:30 AM I met with the patient for the initial interview and physical examination. Discussed plan for treatment and workup in the ED.  1:55 AM Rechecked and updated the patient. We discussed the plan for discharge and the patient is agreeable. Reviewed supportive cares, symptomatic treatment, outpatient follow up, and reasons to return to the Emergency Department. Patient to be discharged by ED RN.       This patient involved a high degree of complexity in medical decision making, as significant risks were present and assessed. Recent encounters & results in medical record reviewed by me.    All workup (i.e. any EKG/labs/imaging as per charting below) reviewed and independently interpreted by me. See respective sections for details.    Broad differential considered for this patient presenting, including but not limited to:  Palpitations, arrhythmia, anxiety, dehydration, SABAS, electrolyte derangement, anemia, thyroid disease, infectious process, sepsis, ACS, PE, acute aortic syndrome, tamponade, Boerhaave's, pneumothorax      See additional MDM below if interested.      MEDICATIONS GIVEN IN THE EMERGENCY  DEPARTMENT  Medications   lactated ringers BOLUS 1,000 mL (0 mLs Intravenous Stopped 6/16/23 0219)       NEW PRESCRIPTIONS STARTED AT TODAY'S ER VISIT  Current Discharge Medication List      CONTINUE these medications which have NOT CHANGED    Details   alcohol swab prep pads Use to swab area of injection/marlon as directed.  Qty: 100 each, Refills: 3    Associated Diagnoses: Type 2 diabetes mellitus with hyperglycemia, without long-term current use of insulin (H)      blood glucose (NO BRAND SPECIFIED) test strip Use to test blood sugar 1 times daily or as directed. To accompany: Blood Glucose Monitor Brands: per insurance.  Qty: 100 strip, Refills: 6    Associated Diagnoses: Type 2 diabetes mellitus with hyperglycemia, without long-term current use of insulin (H)      blood glucose calibration (NO BRAND SPECIFIED) solution To accompany: Blood Glucose Monitor Brands: per insurance.  Qty: 1 each, Refills: 11    Associated Diagnoses: Type 2 diabetes mellitus with hyperglycemia, without long-term current use of insulin (H)      blood glucose monitoring (NO BRAND SPECIFIED) meter device kit Use to test blood sugar 1 times daily or as directed. Preferred blood glucose meter OR supplies to accompany: Blood Glucose Monitor Brands: per insurance.  Qty: 1 kit, Refills: 0    Associated Diagnoses: Type 2 diabetes mellitus with hyperglycemia, without long-term current use of insulin (H)      EPINEPHrine (EPIPEN/ADRENACLICK/OR ANY BX GENERIC EQUIV) 0.3 MG/0.3ML injection 2-pack Inject 0.3 mLs (0.3 mg) into the muscle as needed for anaphylaxis  Qty: 0.6 mL, Refills: 1    Associated Diagnoses: Allergic to bees      fish oil-omega-3 fatty acids 1000 MG capsule Take 2 g by mouth daily.      metFORMIN (GLUCOPHAGE XR) 500 MG 24 hr tablet Take 2 tablets (1,000 mg) by mouth 2 times daily (with meals)  Qty: 360 tablet, Refills: 0    Associated Diagnoses: Type 2 diabetes mellitus with hyperglycemia, without long-term current use of  "insulin (H)      Multiple vitamin TABS Take by mouth daily      thin (NO BRAND SPECIFIED) lancets Use with lanceting device. To accompany: Blood Glucose Monitor Brands: per insurance.  Qty: 100 each, Refills: 6    Associated Diagnoses: Type 2 diabetes mellitus with hyperglycemia, without long-term current use of insulin (H)                 =================================================================      HPI  Patient information was obtained from: Patient    Use of : N/A      Dion Tatum is a 48 year old male with a pertinent history of diabetes mellitus type II, obesity, JEFF, atypical chest pain, and chronic fatigue who presents to this ED via walk in for evaluation of palpitations.    The patient reports he has had intermittent episodes of palpitations over the past 7-10 days that have become more frequent the past couple days, and this evening have lasted longer than normal. He describes the palpitations as his heart beating \"too hard\" with an occasional fluttering sensation. He also has the sensation he is wheezing when he breathes out. He is able to get about 1 minute of relief from the wheezing sensation by coughing, but then the wheezing comes back. He has not had any chest pain, chest pressure, or shortness of breath. The palpitations are not worse when he is standing.    He has had episodes of palpitations in the past, but they have never been this intense or lasted for an extended period of time. He has noticed the episodes of palpitations seem to occur in the evening. He has been stressed the past couple weeks due to his child's graduation and graduation party, but that all ended last week. He has had a normal appetite recently.    The patient has no personal cardiac history. He is unsure if he has any familial cardiac history because he was adopted.    --------------- MEDICAL HISTORY ---------------  PAST MEDICAL HISTORY:  Reviewed independently by me.  Past Medical History: "   Diagnosis Date     Chronic fatigue      Hypertension 2015    slightly higher than normal     Obesity      Type 2 diabetes mellitus with hyperglycemia, without long-term current use of insulin (H) 1/15/2023     Patient Active Problem List   Diagnosis     CARDIOVASCULAR SCREENING; LDL GOAL LESS THAN 160     Chronic fatigue     Elevated blood pressure reading without diagnosis of hypertension     Generalized anxiety disorder     Shoulder joint pain     Atypical chest pain     Obesity (BMI 30-39.9)     JEFF (obstructive sleep apnea)     Testicular lump     Morbid obesity (H)     Type 2 diabetes mellitus with hyperglycemia, without long-term current use of insulin (H)       PAST SURGICAL HISTORY:  Reviewed independently by me.  Past Surgical History:   Procedure Laterality Date     NO HISTORY OF SURGERY       EFRAINDOM ST GUIDEDASIA         CURRENT MEDICATIONS:    Reviewed independently by me.  No current facility-administered medications for this encounter.    Current Outpatient Medications:      alcohol swab prep pads, Use to swab area of injection/marlon as directed., Disp: 100 each, Rfl: 3     blood glucose (NO BRAND SPECIFIED) test strip, Use to test blood sugar 1 times daily or as directed. To accompany: Blood Glucose Monitor Brands: per insurance., Disp: 100 strip, Rfl: 6     blood glucose calibration (NO BRAND SPECIFIED) solution, To accompany: Blood Glucose Monitor Brands: per insurance., Disp: 1 each, Rfl: 11     blood glucose monitoring (NO BRAND SPECIFIED) meter device kit, Use to test blood sugar 1 times daily or as directed. Preferred blood glucose meter OR supplies to accompany: Blood Glucose Monitor Brands: per insurance., Disp: 1 kit, Rfl: 0     EPINEPHrine (EPIPEN/ADRENACLICK/OR ANY BX GENERIC EQUIV) 0.3 MG/0.3ML injection 2-pack, Inject 0.3 mLs (0.3 mg) into the muscle as needed for anaphylaxis, Disp: 0.6 mL, Rfl: 1     fish oil-omega-3 fatty acids 1000 MG capsule, Take 2 g by mouth daily., Disp: , Rfl:       metFORMIN (GLUCOPHAGE XR) 500 MG 24 hr tablet, Take 2 tablets (1,000 mg) by mouth 2 times daily (with meals), Disp: 360 tablet, Rfl: 0     Multiple vitamin TABS, Take by mouth daily, Disp: , Rfl:      thin (NO BRAND SPECIFIED) lancets, Use with lanceting device. To accompany: Blood Glucose Monitor Brands: per insurance., Disp: 100 each, Rfl: 6    ALLERGIES:  Reviewed independently by me.  Allergies   Allergen Reactions     Penicillins        FAMILY HISTORY:  Reviewed independently by me.  Family History   Adopted: Yes   Problem Relation Age of Onset     Unknown/Adopted Mother      Unknown/Adopted Father        SOCIAL HISTORY:   Reviewed independently by me.  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 No     Comment: NA - adopted      Service No     Blood Transfusions No     Caffeine Concern No     Occupational Exposure No     Hobby Hazards No     Sleep Concern Yes     Comment: hard time falling asleep     Stress Concern Yes     Weight Concern Yes     Comment: feels he needs to loose some weight     Special Diet No     Back Care No     Exercise Yes     Comment: occasionally     Bike Helmet No     Seat Belt Yes     Self-Exams Yes       --------------- PHYSICAL EXAM ---------------  Nursing notes and vitals independently reviewed by me.  VITALS:  Vitals:    06/16/23 0011 06/16/23 0030   BP: (!) 181/99 (!)  "152/81   Pulse: 77 75   Resp: 20    Temp: 97  F (36.1  C)    TempSrc: Temporal    SpO2: 96% 95%   Weight: 133.8 kg (295 lb)    Height: 1.854 m (6' 1\")        PHYSICAL EXAM:    General:  alert, interactive, no distress  Eyes:  conjunctivae clear, conjugate gaze  HENT:  atraumatic, nose with no rhinorrhea, oropharynx clear  Neck:  no meningismus  Cardiovascular:  HR 70's during exam, regular rhythm, no murmurs, brisk cap refill  Chest:  no chest wall tenderness  Pulmonary:  no stridor, normal phonation, normal work of breathing, clear lungs bilaterally  Abdomen:  soft, nondistended, nontender  :  no CVA tenderness  Back:  no midline spinal tenderness  Musculoskeletal:  no pretibial edema, no calf tenderness. Gross ROM intact to joints of extremities with no obvious deformities.  Skin:  warm, dry, no rash  Neuro:  awake, alert, answers questions appropriately, follows commands, moves all limbs, 5/5 strength to all extremities with sensation to light touch intact, no tremor  Psych:  calm, normal affect      --------------- RESULTS ---------------  EKG:    Reviewed and independently interpreted by me.  - NSR at 73bpm, no ST changes, small symmetric TWI in III/aVF, normal intervals (, QRS 98, QTc 453)  - unremarkable EKG; no priors for comparison  My read.    LAB:  Reviewed and independently interpreted by me.  Results for orders placed or performed during the hospital encounter of 06/16/23   XR Chest Port 1 View    Impression    IMPRESSION: No focal airspace consolidation. No pleural effusion or pneumothorax.    Cardiomediastinal silhouette is normal.   Basic metabolic panel   Result Value Ref Range    Sodium 137 136 - 145 mmol/L    Potassium 4.2 3.4 - 5.3 mmol/L    Chloride 100 98 - 107 mmol/L    Carbon Dioxide (CO2) 26 22 - 29 mmol/L    Anion Gap 11 7 - 15 mmol/L    Urea Nitrogen 17.4 6.0 - 20.0 mg/dL    Creatinine 0.97 0.67 - 1.17 mg/dL    Calcium 9.4 8.6 - 10.0 mg/dL    Glucose 119 (H) 70 - 99 mg/dL    GFR " Estimate >90 >60 mL/min/1.73m2   Result Value Ref Range    Lipase 36 13 - 60 U/L   Result Value Ref Range    Troponin T, High Sensitivity 7 <=22 ng/L   Result Value Ref Range    Magnesium 2.0 1.7 - 2.3 mg/dL   TSH with free T4 reflex   Result Value Ref Range    TSH 3.41 0.30 - 4.20 uIU/mL   Hepatic function panel   Result Value Ref Range    Protein Total 7.3 6.4 - 8.3 g/dL    Albumin 4.2 3.5 - 5.2 g/dL    Bilirubin Total <0.2 <=1.2 mg/dL    Alkaline Phosphatase 92 40 - 129 U/L    AST 30 0 - 45 U/L    ALT 40 0 - 70 U/L    Bilirubin Direct <0.20 0.00 - 0.30 mg/dL   CBC with platelets and differential   Result Value Ref Range    WBC Count 10.3 4.0 - 11.0 10e3/uL    RBC Count 4.83 4.40 - 5.90 10e6/uL    Hemoglobin 14.5 13.3 - 17.7 g/dL    Hematocrit 41.7 40.0 - 53.0 %    MCV 86 78 - 100 fL    MCH 30.0 26.5 - 33.0 pg    MCHC 34.8 31.5 - 36.5 g/dL    RDW 12.9 10.0 - 15.0 %    Platelet Count 194 150 - 450 10e3/uL    % Neutrophils 47 %    % Lymphocytes 40 %    % Monocytes 8 %    % Eosinophils 3 %    % Basophils 1 %    % Immature Granulocytes 1 %    NRBCs per 100 WBC 0 <1 /100    Absolute Neutrophils 5.0 1.6 - 8.3 10e3/uL    Absolute Lymphocytes 4.1 0.8 - 5.3 10e3/uL    Absolute Monocytes 0.8 0.0 - 1.3 10e3/uL    Absolute Eosinophils 0.3 0.0 - 0.7 10e3/uL    Absolute Basophils 0.1 0.0 - 0.2 10e3/uL    Absolute Immature Granulocytes 0.1 <=0.4 10e3/uL    Absolute NRBCs 0.0 10e3/uL       RADIOLOGY:  Reviewed and independently interpreted by me. Please see official radiology report.  Recent Results (from the past 24 hour(s))   XR Chest Port 1 View    Narrative    EXAM: XR CHEST PORT 1 VIEW  LOCATION: [facility]  DATE: 6/16/2023    INDICATION: Chest pain.  COMPARISON: None available.      Impression    IMPRESSION: No focal airspace consolidation. No pleural effusion or pneumothorax.    Cardiomediastinal silhouette is normal.         PROCEDURES:   Procedures    --------------------------------------------------------------------------------   Cardiac telemetry monitoring ordered, reviewed, and independently interpreted by me while patient was in the Emergency Department. Revealed no acute abnormalities.  --------------------------------------------------------------------------------                 --------------- ADDITIONAL MDM ---------------  History:  - Supplemental history from:       -- see above charting, if applicable: patient  - External Record(s) reviewed:       -- see above charting, if applicable       -- Inpatient/outpatient record (clinic visit 1/9/23), prior labs (blood 12/22/22), prior imaging (US 3/9/20)    Workup:  - Chart documentation above includes differential considered and any EKGs or imaging independently interpreted by provider.  - Systemic symptoms of presenting illness.  - In additional to work up documented, I considered the following work up:       -- see above charting, if applicable    External Consultation:  - Discussion of management with another provider:       -- see above charting, if applicable    Complicating Factors:  - Care impacted by chronic illness:       -- see above MDM, past medical history, & problem list  - Care affected by social determinants of health:       -- see above social history       -- Access to Affordable Healthcare    Disposition Considerations:  - Discharge       -- I considered admission given that the patient came to the Emergency Department, but ultimately discharged the patient per decision making above       -- I recommended the patient continue their current prescription strength medication(s) as charted above in current medications list       -- I recommended over-the-counter medication(s) as charted above & in discharge instructions         I, Jordan Garduno, am serving as a scribe to document services personally performed by Dr. Marcelino Black based on my observation and the provider's statements to  me. I, Marcelino Black MD attest that Jordan Garduno is acting in a scribe capacity, has observed my performance of the services and has documented them in accordance with my direction.      Marcelino Black MD  06/16/23  Emergency Medicine  [facility] EMERGENCY DEPARTMENT  [address] [place] [id number]  [phone]  Dept: [phone]     Marcelino Black MD  06/16/23 0225

## 2023-06-16 NOTE — DISCHARGE INSTRUCTIONS
Cardiology clinic will call you to arrange follow up with them.    Follow up with your Primary Care provider in 2 days for a recheck.    Return to the Emergency Department for any difficulty breathing, severe worsening, or any other concerns.

## 2023-06-19 LAB
ATRIAL RATE - MUSE: 73 BPM
DIASTOLIC BLOOD PRESSURE - MUSE: NORMAL MMHG
INTERPRETATION ECG - MUSE: NORMAL
P AXIS - MUSE: 14 DEGREES
PR INTERVAL - MUSE: 162 MS
QRS DURATION - MUSE: 98 MS
QT - MUSE: 412 MS
QTC - MUSE: 453 MS
R AXIS - MUSE: 76 DEGREES
SYSTOLIC BLOOD PRESSURE - MUSE: NORMAL MMHG
T AXIS - MUSE: 4 DEGREES
VENTRICULAR RATE- MUSE: 73 BPM

## 2023-07-25 ENCOUNTER — OFFICE VISIT (OUTPATIENT)
Dept: CARDIOLOGY | Facility: CLINIC | Age: 48
End: 2023-07-25
Attending: EMERGENCY MEDICINE
Payer: COMMERCIAL

## 2023-07-25 VITALS
WEIGHT: 302 LBS | HEIGHT: 73 IN | DIASTOLIC BLOOD PRESSURE: 82 MMHG | HEART RATE: 70 BPM | BODY MASS INDEX: 40.02 KG/M2 | SYSTOLIC BLOOD PRESSURE: 138 MMHG

## 2023-07-25 DIAGNOSIS — R00.2 PALPITATIONS: ICD-10-CM

## 2023-07-25 PROCEDURE — 99204 OFFICE O/P NEW MOD 45 MIN: CPT | Performed by: INTERNAL MEDICINE

## 2023-07-25 NOTE — LETTER
7/25/2023    [place]  [id number] [address] [address]    RE: Dion Tatum       Dear Colleague,     I had the pleasure of seeing Dion Tatum in the [facility].    HEART CARE ENCOUNTER NOTE      [facility]  [phone]      Assessment/Recommendations   Assessment:   1.  Intermittent symptoms of chest fluttering/palpitations which are probably PACs or PVCs.  Doubt sustained tachyarrhythmia such as atrial fibrillation.  He has no significant associated symptoms such as lightheadedness but is mostly anxious about the significance of these symptoms.  He does consume about 2 cups of coffee in the morning but does not see a direct relationship with his symptoms as they usually occur in the evening, a long time after his caffeine ingestion.  It is possible the symptoms may be dyspeptic and have nothing to do with his cardiac rhythm.    Plan:   1.  We will get a 14-day leadless monitor to capture any cardiac arrhythmias if they are occurring.  We will need to correlate any findings on the monitor with his symptoms and I instructed him to keep a careful log while wearing the monitor.  2.  No new medications or other testing at this time.           History of Present Illness/Subjective    HPI: Dion Tatum is a 48 year old male without prior cardiac history.  He was seen in the emergency department recently after developing symptoms of forceful heart beating with a sensation of fluttering.  He has been having episodes like this going back a few months which typically occur in the evening when he is relaxing rather than with exertion.  The symptoms usually last 1 to 2 minutes at the most and occur only intermittently.  He has no other associated symptoms such as lightheadedness or shortness of breath.  He denies any chest pain symptoms with activity.  In December last year was found to have significant elevated glucose consistent with type 2 diabetes.  He  "was started on metformin and began following a much better diet.  With this he is lost about 15 to 20 pounds since that time in fact he now feels much better than he did then.  His said his his stamina is definitely increased compared to last year.  He golfs with his son on a regular basis and is able to walk the golf course without limiting symptoms.  He drinks about 2 cups of coffee in the morning and occasional caffeine-containing sodas.  He has not noticed a correlation with his caffeine intake as his symptoms usually occur in the evening long after he has quit ingesting caffeine.  Evaluation emergency department included serial troponins, electrocardiogram with nonspecific findings by my reading, and cardiac rhythm monitoring for a few hours, all of which were unremarkable.    Studies reviewed:  EC23: Sinus rhythm, nonspecific ST-T changes.  Personally reviewed.         Physical Examination  Review of Systems   /82 (BP Location: Right arm, Patient Position: Sitting, Cuff Size: Adult Large)   Pulse 70   Ht 1.854 m (6' 1\")   Wt 137 kg (302 lb)   BMI 39.84 kg/m    Body mass index is 39.84 kg/m .  Wt Readings from Last 3 Encounters:   23 137 kg (302 lb)   23 133.8 kg (295 lb)   22 142.2 kg (313 lb 6.4 oz)       General Appearance:   Pleasant  male appears stated age. no acute distress, moderately obese body habitus   ENT/Mouth: Oropharynx normal    EYES:  no scleral icterus, normal conjunctivae. No eyelid xanthelasma   Neck: No cervical lymphadenopathy  Thyroid not enlarged or nodular  No JVD   Respiratory:   lungs clear , no rales or wheezing, Normal chest wall expansion    Cardiovascular:   Completely regular rhythm, normal rate. Normal 1st and 2nd heart sounds.  No apparent murmurs, no rubs or gallops;   radial pulses are full and equal   Jugular venous pressure is not elevated   Abdomen/GI:  No tenderness, no organomegaly, no pulsatile masses. NBS.   Extremities: No cyanosis " or clubbing.  No peripheral edema   Skin: No rash or lesions   Heme/lymph/ Immunology No lymphadenopathy, petechiae   Neurologic: Alert and oriented. No focal motor weakness, gait appears normal.       Psychiatric: Pleasant, calm, appropriate affect.          No known hepatic, renal, pulmonary, or CNS disorders. The remainder of the complete ROS is negative except as noted above.         Medical History  Surgical History Family History Social History   Past Medical History:   Diagnosis Date    Chronic fatigue     Hypertension 2015    slightly higher than normal    Obesity     Type 2 diabetes mellitus with hyperglycemia, without long-term current use of insulin (H) 1/15/2023     Past Surgical History:   Procedure Laterality Date    NO HISTORY OF SURGERY      [facility]       Family History   Adopted: Yes   Problem Relation Age of Onset    Unknown/Adopted Mother     Unknown/Adopted Father         Social History     Socioeconomic History    Marital status:      Spouse name: Not on file    Number of children: Not on file    Years of education: Not on file    Highest education level: Not on file   Occupational History    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 No     Comment: NA - adopted     Service No    Blood Transfusions No    Caffeine Concern No    Occupational Exposure No    Hobby Hazards No    Sleep Concern Yes     Comment: hard time falling asleep    Stress Concern Yes    Weight Concern Yes     Comment: feels he needs to loose some weight    Special Diet No    Back Care No    Exercise Yes      Comment: occasionally    Bike Helmet No    Seat Belt Yes    Self-Exams Yes   Social History Narrative    Not on file     Social Determinants of Health     Financial Resource Strain: Not on file   Food Insecurity: Not on file   Transportation Needs: Not on file   Physical Activity: Not on file   Stress: Not on file   Social Connections: Not on file   Intimate Partner Violence: Not on file   Housing Stability: Not on file           Medications  Allergies   Current Outpatient Medications   Medication Sig Dispense Refill    EPINEPHrine (EPIPEN/ADRENACLICK/OR ANY BX GENERIC EQUIV) 0.3 MG/0.3ML injection 2-pack Inject 0.3 mLs (0.3 mg) into the muscle as needed for anaphylaxis 0.6 mL 1    fish oil-omega-3 fatty acids 1000 MG capsule Take 2 g by mouth daily.      metFORMIN (GLUCOPHAGE XR) 500 MG 24 hr tablet Take 2 tablets (1,000 mg) by mouth 2 times daily (with meals) 360 tablet 0    Multiple vitamin TABS Take by mouth daily      alcohol swab prep pads Use to swab area of injection/marlon as directed. (Patient not taking: Reported on 7/25/2023) 100 each 3    blood glucose (NO BRAND SPECIFIED) test strip Use to test blood sugar 1 times daily or as directed. To accompany: Blood Glucose Monitor Brands: per insurance. (Patient not taking: Reported on 7/25/2023) 100 strip 6    blood glucose calibration (NO BRAND SPECIFIED) solution To accompany: Blood Glucose Monitor Brands: per insurance. (Patient not taking: Reported on 7/25/2023) 1 each 11    blood glucose monitoring (NO BRAND SPECIFIED) meter device kit Use to test blood sugar 1 times daily or as directed. Preferred blood glucose meter OR supplies to accompany: Blood Glucose Monitor Brands: per insurance. (Patient not taking: Reported on 7/25/2023) 1 kit 0    thin (NO BRAND SPECIFIED) lancets Use with lanceting device. To accompany: Blood Glucose Monitor Brands: per insurance. (Patient not taking: Reported on 7/25/2023) 100 each 6       Allergies   Allergen Reactions     Penicillins           Lab Results    Chemistry/lipid CBC Cardiac Enzymes/BNP/TSH/INR   Recent Labs   Lab Test 12/22/22  0833   CHOL 186   HDL 33*   LDL 81   TRIG 359*     Recent Labs   Lab Test 12/22/22  0833 12/06/19  0819 01/30/18  0753   LDL 81 70 84     Recent Labs   Lab Test 06/16/23  0116      POTASSIUM 4.2   CHLORIDE 100   CO2 26   *   BUN 17.4   CR 0.97   GFRESTIMATED >90   MANDEEP 9.4     Recent Labs   Lab Test 06/16/23  0116 12/26/22  1000   CR 0.97 1.07     Recent Labs   Lab Test 12/26/22  1000   A1C 8.9*          Recent Labs   Lab Test 06/16/23  0116   WBC 10.3   HGB 14.5   HCT 41.7   MCV 86        Recent Labs   Lab Test 06/16/23  0116   HGB 14.5    No results for input(s): TROPONINI in the last 06142 hours.  No results for input(s): BNP, NTBNPI, NTBNP in the last 26738 hours.  Recent Labs   Lab Test 06/16/23  0116   TSH 3.41     No results for input(s): INR in the last 45654 hours.     Obi Arora MD        Thank you for allowing me to participate in the care of your patient.      Sincerely,     Obi Arora MD     [facility]  cc:   Marcelino Black MD  [facility] [place] [address]

## 2023-07-25 NOTE — PROGRESS NOTES
HEART CARE ENCOUNTER NOTE      [facility]  [phone]      Assessment/Recommendations   Assessment:   1.  Intermittent symptoms of chest fluttering/palpitations which are probably PACs or PVCs.  Doubt sustained tachyarrhythmia such as atrial fibrillation.  He has no significant associated symptoms such as lightheadedness but is mostly anxious about the significance of these symptoms.  He does consume about 2 cups of coffee in the morning but does not see a direct relationship with his symptoms as they usually occur in the evening, a long time after his caffeine ingestion.  It is possible the symptoms may be dyspeptic and have nothing to do with his cardiac rhythm.    Plan:   1.  We will get a 14-day leadless monitor to capture any cardiac arrhythmias if they are occurring.  We will need to correlate any findings on the monitor with his symptoms and I instructed him to keep a careful log while wearing the monitor.  2.  No new medications or other testing at this time.           History of Present Illness/Subjective    HPI: Dion Tatum is a 48 year old male without prior cardiac history.  He was seen in the emergency department recently after developing symptoms of forceful heart beating with a sensation of fluttering.  He has been having episodes like this going back a few months which typically occur in the evening when he is relaxing rather than with exertion.  The symptoms usually last 1 to 2 minutes at the most and occur only intermittently.  He has no other associated symptoms such as lightheadedness or shortness of breath.  He denies any chest pain symptoms with activity.  In December last year was found to have significant elevated glucose consistent with type 2 diabetes.  He was started on metformin and began following a much better diet.  With this he is lost about 15 to 20 pounds since that time in fact he now feels much better than he did then.  His said his his stamina is  "definitely increased compared to last year.  He golfs with his son on a regular basis and is able to walk the golf course without limiting symptoms.  He drinks about 2 cups of coffee in the morning and occasional caffeine-containing sodas.  He has not noticed a correlation with his caffeine intake as his symptoms usually occur in the evening long after he has quit ingesting caffeine.  Evaluation emergency department included serial troponins, electrocardiogram with nonspecific findings by my reading, and cardiac rhythm monitoring for a few hours, all of which were unremarkable.    Studies reviewed:  EC23: Sinus rhythm, nonspecific ST-T changes.  Personally reviewed.         Physical Examination  Review of Systems   /82 (BP Location: Right arm, Patient Position: Sitting, Cuff Size: Adult Large)   Pulse 70   Ht 1.854 m (6' 1\")   Wt 137 kg (302 lb)   BMI 39.84 kg/m    Body mass index is 39.84 kg/m .  Wt Readings from Last 3 Encounters:   23 137 kg (302 lb)   23 133.8 kg (295 lb)   22 142.2 kg (313 lb 6.4 oz)       General Appearance:   Pleasant  male appears stated age. no acute distress, moderately obese body habitus   ENT/Mouth: Oropharynx normal    EYES:  no scleral icterus, normal conjunctivae. No eyelid xanthelasma   Neck: No cervical lymphadenopathy  Thyroid not enlarged or nodular  No JVD   Respiratory:   lungs clear , no rales or wheezing, Normal chest wall expansion    Cardiovascular:   Completely regular rhythm, normal rate. Normal 1st and 2nd heart sounds.  No apparent murmurs, no rubs or gallops;   radial pulses are full and equal   Jugular venous pressure is not elevated   Abdomen/GI:  No tenderness, no organomegaly, no pulsatile masses. NBS.   Extremities: No cyanosis or clubbing.  No peripheral edema   Skin: No rash or lesions   Heme/lymph/ Immunology No lymphadenopathy, petechiae   Neurologic: Alert and oriented. No focal motor weakness, gait appears normal.     "   Psychiatric: Pleasant, calm, appropriate affect.          No known hepatic, renal, pulmonary, or CNS disorders. The remainder of the complete ROS is negative except as noted above.         Medical History  Surgical History Family History Social History   Past Medical History:   Diagnosis Date    Chronic fatigue     Hypertension 2015    slightly higher than normal    Obesity     Type 2 diabetes mellitus with hyperglycemia, without long-term current use of insulin (H) 1/15/2023     Past Surgical History:   Procedure Laterality Date    NO HISTORY OF SURGERY      [facility]       Family History   Adopted: Yes   Problem Relation Age of Onset    Unknown/Adopted Mother     Unknown/Adopted Father         Social History     Socioeconomic History    Marital status:      Spouse name: Not on file    Number of children: Not on file    Years of education: Not on file    Highest education level: Not on file   Occupational History    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 No     Comment: NA - adopted     Service No    Blood Transfusions No    Caffeine Concern No    Occupational Exposure No    Hobby Hazards No    Sleep Concern Yes     Comment: hard time falling asleep    Stress Concern Yes    Weight Concern Yes     Comment: feels he needs to loose some weight    Special Diet No    Back Care No    Exercise Yes     Comment: occasionally    Bike Helmet No    Seat Belt Yes    Self-Exams Yes   Social History Narrative    Not on file     Social Determinants of Health     Financial Resource Strain: Not on file   Food  Insecurity: Not on file   Transportation Needs: Not on file   Physical Activity: Not on file   Stress: Not on file   Social Connections: Not on file   Intimate Partner Violence: Not on file   Housing Stability: Not on file           Medications  Allergies   Current Outpatient Medications   Medication Sig Dispense Refill    EPINEPHrine (EPIPEN/ADRENACLICK/OR ANY BX GENERIC EQUIV) 0.3 MG/0.3ML injection 2-pack Inject 0.3 mLs (0.3 mg) into the muscle as needed for anaphylaxis 0.6 mL 1    fish oil-omega-3 fatty acids 1000 MG capsule Take 2 g by mouth daily.      metFORMIN (GLUCOPHAGE XR) 500 MG 24 hr tablet Take 2 tablets (1,000 mg) by mouth 2 times daily (with meals) 360 tablet 0    Multiple vitamin TABS Take by mouth daily      alcohol swab prep pads Use to swab area of injection/marlon as directed. (Patient not taking: Reported on 7/25/2023) 100 each 3    blood glucose (NO BRAND SPECIFIED) test strip Use to test blood sugar 1 times daily or as directed. To accompany: Blood Glucose Monitor Brands: per insurance. (Patient not taking: Reported on 7/25/2023) 100 strip 6    blood glucose calibration (NO BRAND SPECIFIED) solution To accompany: Blood Glucose Monitor Brands: per insurance. (Patient not taking: Reported on 7/25/2023) 1 each 11    blood glucose monitoring (NO BRAND SPECIFIED) meter device kit Use to test blood sugar 1 times daily or as directed. Preferred blood glucose meter OR supplies to accompany: Blood Glucose Monitor Brands: per insurance. (Patient not taking: Reported on 7/25/2023) 1 kit 0    thin (NO BRAND SPECIFIED) lancets Use with lanceting device. To accompany: Blood Glucose Monitor Brands: per insurance. (Patient not taking: Reported on 7/25/2023) 100 each 6       Allergies   Allergen Reactions    Penicillins           Lab Results    Chemistry/lipid CBC Cardiac Enzymes/BNP/TSH/INR   Recent Labs   Lab Test 12/22/22  0833   CHOL 186   HDL 33*   LDL 81   TRIG 359*     Recent Labs   Lab Test  12/22/22  0833 12/06/19  0819 01/30/18  0753   LDL 81 70 84     Recent Labs   Lab Test 06/16/23  0116      POTASSIUM 4.2   CHLORIDE 100   CO2 26   *   BUN 17.4   CR 0.97   GFRESTIMATED >90   MANDEEP 9.4     Recent Labs   Lab Test 06/16/23  0116 12/26/22  1000   CR 0.97 1.07     Recent Labs   Lab Test 12/26/22  1000   A1C 8.9*          Recent Labs   Lab Test 06/16/23  0116   WBC 10.3   HGB 14.5   HCT 41.7   MCV 86        Recent Labs   Lab Test 06/16/23 0116   HGB 14.5    No results for input(s): TROPONINI in the last 48247 hours.  No results for input(s): BNP, NTBNPI, NTBNP in the last 63465 hours.  Recent Labs   Lab Test 06/16/23  0116   TSH 3.41     No results for input(s): INR in the last 87198 hours.     Obi Arora MD

## 2023-07-27 ENCOUNTER — DOCUMENTATION ONLY (OUTPATIENT)
Dept: SLEEP MEDICINE | Facility: CLINIC | Age: 48
End: 2023-07-27
Payer: COMMERCIAL

## 2023-08-07 DIAGNOSIS — G47.33 OBSTRUCTIVE SLEEP APNEA (ADULT) (PEDIATRIC): Primary | ICD-10-CM

## 2023-08-10 ENCOUNTER — HOSPITAL ENCOUNTER (OUTPATIENT)
Dept: CARDIOLOGY | Facility: CLINIC | Age: 48
Discharge: HOME | End: 2023-08-10
Attending: INTERNAL MEDICINE | Admitting: INTERNAL MEDICINE
Payer: COMMERCIAL

## 2023-08-10 DIAGNOSIS — R00.2 PALPITATIONS: ICD-10-CM

## 2023-08-10 PROCEDURE — 93246 EXT ECG>7D<15D RECORDING: CPT

## 2023-08-10 PROCEDURE — 93244 EXT ECG>48HR<7D REV&INTERPJ: CPT | Performed by: INTERNAL MEDICINE

## 2023-08-29 NOTE — RESULT ENCOUNTER NOTE
Monitor was benign. No arrhythmias noted. I doubt the symptoms are cardiac, probably dyspeptic. No further cardiac testing planned.Obi Arora MD on 8/29/2023 at 8:29 AM

## 2023-09-07 ENCOUNTER — MYC MEDICAL ADVICE (OUTPATIENT)
Dept: FAMILY MEDICINE | Facility: CLINIC | Age: 48
End: 2023-09-07
Payer: COMMERCIAL

## 2023-09-07 DIAGNOSIS — E11.65 TYPE 2 DIABETES MELLITUS WITH HYPERGLYCEMIA, WITHOUT LONG-TERM CURRENT USE OF INSULIN (H): Primary | ICD-10-CM

## 2023-09-24 ENCOUNTER — HEALTH MAINTENANCE LETTER (OUTPATIENT)
Age: 48
End: 2023-09-24

## 2023-10-06 ENCOUNTER — OFFICE VISIT (OUTPATIENT)
Dept: SLEEP MEDICINE | Facility: CLINIC | Age: 48
End: 2023-10-06
Payer: COMMERCIAL

## 2023-10-06 VITALS
SYSTOLIC BLOOD PRESSURE: 150 MMHG | DIASTOLIC BLOOD PRESSURE: 86 MMHG | OXYGEN SATURATION: 97 % | HEART RATE: 89 BPM | BODY MASS INDEX: 40.95 KG/M2 | WEIGHT: 309 LBS | HEIGHT: 73 IN

## 2023-10-06 DIAGNOSIS — G47.33 OSA (OBSTRUCTIVE SLEEP APNEA): Primary | ICD-10-CM

## 2023-10-06 PROCEDURE — 99205 OFFICE O/P NEW HI 60 MIN: CPT | Performed by: INTERNAL MEDICINE

## 2023-10-06 ASSESSMENT — SLEEP AND FATIGUE QUESTIONNAIRES
HOW LIKELY ARE YOU TO NOD OFF OR FALL ASLEEP WHEN YOU ARE A PASSENGER IN A CAR FOR AN HOUR WITHOUT A BREAK: WOULD NEVER DOZE
HOW LIKELY ARE YOU TO NOD OFF OR FALL ASLEEP IN A CAR, WHILE STOPPED FOR A FEW MINUTES IN TRAFFIC: WOULD NEVER DOZE
HOW LIKELY ARE YOU TO NOD OFF OR FALL ASLEEP WHILE LYING DOWN TO REST IN THE AFTERNOON WHEN CIRCUMSTANCES PERMIT: WOULD NEVER DOZE
HOW LIKELY ARE YOU TO NOD OFF OR FALL ASLEEP WHILE SITTING QUIETLY AFTER LUNCH WITHOUT ALCOHOL: SLIGHT CHANCE OF DOZING
HOW LIKELY ARE YOU TO NOD OFF OR FALL ASLEEP WHILE SITTING INACTIVE IN A PUBLIC PLACE: WOULD NEVER DOZE
HOW LIKELY ARE YOU TO NOD OFF OR FALL ASLEEP WHILE SITTING AND TALKING TO SOMEONE: WOULD NEVER DOZE
HOW LIKELY ARE YOU TO NOD OFF OR FALL ASLEEP WHILE WATCHING TV: SLIGHT CHANCE OF DOZING
HOW LIKELY ARE YOU TO NOD OFF OR FALL ASLEEP WHILE SITTING AND READING: SLIGHT CHANCE OF DOZING
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
## 2023-10-06 NOTE — PATIENT INSTRUCTIONS
CBT-I Introductory Module    Understanding Sleep and Insomnia    Most people have trouble sleeping at some point in their life.   Chronic insomnia means you have had trouble falling asleep and/or staying asleep for at least the past three months.  Despite allowing enough time for sleep, it is affecting how you feel. You are not alone.  It is estimated that 10-15% of adults experience chronic insomnia.     Cognitive Behavioral Therapy for Insomnia (CBT-I)    Consistent with the guidelines of the American College of Physicians, [facility] recommends  Cognitive Behavioral Therapy for Insomnia (CBT-I) as the first-line treatment for insomnia.  CBT-I targets factors that lead to long-term insomnia:    Behavioral Conditioning    When you lay awake in bed over many nights, your body actually becomes trained or 'conditioned' to be awake during the night.  It makes the bed associated with alertness instead of sleepiness.    Habits that weaken your body's sleep drive and circadian sleep rhythm    Changing sleep schedules, extended time in bed, using mobile devices and computers close to bedtime, and naps too late in the day can harm your sleep at night.    Emotional and Physical Arousal    Things such as worrying about sleep, stress, drinking too much caffeine, and not winding down before bed can interfere with your body's natural sleep drive.    Understanding Sleep and Insomnia    [facility] CBT-I is a four-part program that teaches you the skills needed for a better night's sleep. The first step in your program is learning a bit about sleep and insomnia.      The Basics of Sleep    How does sleep help us?    Sleep, like food and water, is something we need every day. The purpose of sleep is still not exactly clear, but sleep experts agree we need consistent quality sleep to function at our best. There is evidence that sleep helps maintain brain and body functions.  It helps maintain thinking ability  and mood.  Sleep is actually a very active part of life. Sleep occurs in four stages that cycle every  minutes throughout the night. We get our deepest sleep during the first few hours of sleep.  During the last half of our sleep, we usually get the bulk of our REM (Rapid Eye Movement) sleep.  REM sleep is when most of our dreaming occurs.    How much sleep do we need?    Sleep needs vary from person to person. Most adults need between 6-8 hours of sleep.  Sleeping too little or too much may be a health risk.  As we age, most people report their sleep gets lighter, earlier, shorter, and more restless.     What Controls Our Sleep?    The three things that regulate your sleep are your sleep drive, biological clock and your arousal system (emotional and physical).  Together these make us feel alert during the day and promote sleep at night.    Your sleep drive depends on how long you have been awake. It is lowest when you first wake up.  Sleep drive gradually increases as the day goes on.  The longer time you are awake the easier it is to fall asleep.  Sleeping gradually reduces your sleep drive. That is why napping in the evening or close to bed can make it harder to sleep at night.    Your biological clock promotes wakefulness during the day and sleep at night.          From Ahsan et al. (2009). Evaluation and Management of Insomnia in the Psychiatric setting. Published Online: 19/1/2009; DOI: https://doi.org/10.1176/foc.7.4.qzf542JR      Understanding Insomnia    What causes insomnia?    Some people have a greater predisposition to developing insomnia due to genetics, personality or age. A host of things can trigger or precipitate it: jet lag, working a different shift, medication, or the onset of a medical or mental health condition.         Insomnia and Your Arousal System    Mental activity, emotions and physical symptoms can make your brain too active to sleep by masking the strength of your sleep  drive. Your brain's arousal system not only triggers insomnia, it plays a role in maintaining it as well.  Common sources of arousal include:    Worry about sleep in bed  An active mind concerned about unfinished tasks  Anxiety, Stress and Depression  Pain     What perpetuates insomnia?    Short-term insomnia can become chronic when you begin to feel fearful, worried and  on guard  about sleep loss. As you spend more time in bed or try forcing yourself to sleep your bed becomes linked with wakefulness.  This is why people with insomnia commonly report feeling tired before bed but suddenly more alert when getting into bed.  This type of  conditioning  along with unhelpful sleep habits maintains the insomnia even when the triggering event has resolved.    Tracking your Sleep    Sleep tracking is an essential part of training yourself to sleep better and monitoring your progress.  There are several ways to keep track of your sleep habits.     Insomnia  Rhona    The Insomnia  rhona is a convenient way to keep track of your sleep prior to and during treatment.  Simply download the free rhona on your Apple or Android phone and record your information each morning.   The data you enter should be your recollection of the past nigh of sleep. Do not watch or monitor the clock in the middle of the night while keeping your sleep diary.     The rhona also includes training and sleep schedule recommendations.  We recommend you use only the tracking function unless instructed by your provider. You can email your data to yourself prior to your visit by using the [employer] User Data function found in the Settings Section.  It is important that you have your data available to review with your provider at the time of your visit.             [employer] Sleep Diary    You can also track your sleep using the [employer] paper sleep diary.  You can upload your sleep diary and send it via a [employer] message  or have it with you  at the time of your visit.        Mobile and Wearable Sleep Tracking Devices    There are many sleep tracking devices and mobile applications that estimate the timing and quantity of sleep by measuring body movement.  Though many of these devices claim to measure the depth or stages of sleep, they cannot measure brain wave activity or other indicators required to determine the actual stages of sleep.  They are helpful in estimating the timing and total amount of time you sleep.    CBT-I and Sleeping Pills    Sleep medications can be helpful in the short-term but often stop working in the long-term.  Sleeping pills treat symptoms and not the underlying cause of insomnia.  They also can have side effects that last well into the day. Abruptly stopping sleeping pills can cause temporary rebound insomnia and lead to increased distress about sleeplessness.  This in turn strengthens the belief that pills are necessary for sleep.    Many patients choose to discontinue sleeping pills prior to beginning CBT-I.  You should talk to your prescribing provider before tapering or discontinuing sleep medication.      Your BMI is Body mass index is 40.77 kg/m .    What is BMI?  Body mass index (BMI) is one way to tell whether you are at a healthy weight, overweight, or obese. It measures your weight in relation to your height.  A BMI of 18.5 to 24.9 is in the healthy range. A person with a BMI of 25 to 29.9 is considered overweight, and someone with a BMI of 30 or greater is considered obese.  Another way to find out if you are at risk for health problems caused by overweight and obesity is to measure your waist. If you are a woman and your waist is more than 35 inches, or if you are a man and your waist is more than 40 inches, your risk of disease may be higher.  More than two-thirds of American adults are considered overweight or obese. Being overweight or obese increases the risk for further weight gain.  Excess weight may lead to  heart disease and diabetes. Creating and following plans for healthy eating and physical activity may help you improve your health.    Methods for maintaining or losing weight.  Weight control is part of healthy lifestyle and includes exercise, emotional health, and healthy eating habits.  Careful eating habits lifelong is the mainstay of weight control.  Though there are significant health benefits from weight loss, long-term weight loss with diet alone may be very difficult to achieve- studies show long-term success with dietary management in less than 10% of people. Attaining a healthy weight may be especially difficult to achieve in those with severe obesity. In some cases, medications, devices and surgical management might be considered.    What can you do?  If you are overweight or obese and are interested in methods for weight loss, you should discuss this with your provider. In addition, we recommend that you review healthy life styles and methods for weight loss available through the National Institutes of Health patient information sites:   http://win.niddk.nih.gov/publications/index.htm      Your blood pressure was checked while you were in clinic today.  Please read the guidelines below about what these numbers mean and what you should do about them.  Your systolic blood pressure is the top number.  This is the pressure when the heart is pumping.  Your diastolic blood pressure is the bottom number.  This is the pressure in between beats.  If your systolic blood pressure is less than 120 and your diastolic blood pressure is less than 80, then your blood pressure is normal. There is nothing more that you need to do about it  If your systolic blood pressure is 120-139 or your diastolic blood pressure is 80-89, your blood pressure may be higher than it should be.  You should have your blood pressure re-checked within a year by a primary care provider.  If your systolic blood pressure is 140 or greater or  your diastolic blood pressure is 90 or greater, you may have high blood pressure.  High blood pressure is treatable, but if left untreated over time it can put you at risk for heart attack, stroke, or kidney failure.  You should have your blood pressure re-checked by a primary care provider within the next four weeks.

## 2023-12-05 ENCOUNTER — OFFICE VISIT (OUTPATIENT)
Dept: FAMILY MEDICINE | Facility: CLINIC | Age: 48
End: 2023-12-05
Payer: COMMERCIAL

## 2023-12-05 ENCOUNTER — TELEPHONE (OUTPATIENT)
Dept: FAMILY MEDICINE | Facility: CLINIC | Age: 48
End: 2023-12-05

## 2023-12-05 VITALS
RESPIRATION RATE: 20 BRPM | DIASTOLIC BLOOD PRESSURE: 70 MMHG | OXYGEN SATURATION: 96 % | HEIGHT: 73 IN | SYSTOLIC BLOOD PRESSURE: 132 MMHG | BODY MASS INDEX: 40.34 KG/M2 | HEART RATE: 80 BPM | WEIGHT: 304.4 LBS | TEMPERATURE: 98.8 F

## 2023-12-05 DIAGNOSIS — E11.65 TYPE 2 DIABETES MELLITUS WITH HYPERGLYCEMIA, WITHOUT LONG-TERM CURRENT USE OF INSULIN (H): ICD-10-CM

## 2023-12-05 DIAGNOSIS — H69.93 DYSFUNCTION OF BOTH EUSTACHIAN TUBES: Primary | ICD-10-CM

## 2023-12-05 PROCEDURE — 99213 OFFICE O/P EST LOW 20 MIN: CPT | Performed by: STUDENT IN AN ORGANIZED HEALTH CARE EDUCATION/TRAINING PROGRAM

## 2023-12-05 RX ORDER — FLUTICASONE PROPIONATE 50 MCG
SPRAY, SUSPENSION (ML) NASAL
Qty: 16 G | Refills: 0 | Status: SHIPPED | OUTPATIENT
Start: 2023-12-05 | End: 2024-03-19

## 2023-12-05 RX ORDER — FEXOFENADINE HCL 180 MG/1
180 TABLET ORAL DAILY
Qty: 90 TABLET | Refills: 0 | Status: SHIPPED | OUTPATIENT
Start: 2023-12-05 | End: 2024-03-19

## 2023-12-05 NOTE — PROGRESS NOTES
Assessment & Plan       ICD-10-CM    1. Dysfunction of both eustachian tubes  H69.93 fluticasone (FLONASE) 50 MCG/ACT nasal spray     fexofenadine (ALLEGRA) 180 MG tablet      2. Type 2 diabetes mellitus with hyperglycemia, without long-term current use of insulin (H)  E11.65 Hemoglobin A1c           Eustachian tube dysfunction  On exam, has fluid behind both ears without evidence of acute otitis media.  Most consistent with eustachian tube dysfunction  Will do Flonase twice daily x 1 week and Allegra nightly x 1 month  Reviewed pathophysiology and self-limiting nature of eustachian tube dysfunction  In rare cases, fluid behind the ear does not self resolve and can lead to long-term hearing loss.  If patient has had no improvement in symptoms in the next 6 to 8 weeks, he is encouraged to send a [employer] message and we can either get him in for a hearing test or send him to ENT.  He verbalized understanding.    Type 2 diabetes:  Last A1c 8.9 2022  Blood work in June did not include A1c  Patient notes that he has cut out sugar and they are generally within range  Plan:  - Repeat A1c        Laurita Robin DO  [facility]    Melody Ashley is a 48 year old, presenting for the following health issues:  Check Ears  (X2 weeks- ears plugged -crackling- was sick with sinus infection- treated w/antibiotics )        12/5/2023    10:00 AM   Additional Questions   Roomed by NARA Robbins       Sunday before Thanksgiving, patient started to experience congestion and sore throat  Was given Z-Del and started to experience improvement in sinus congestion and pain  Had a lot of rhinorrhea once he started the antibiotics   Was started to feel better.  Then on Thanksgiving, he felt like his ears got plugged up bilaterally.  He reports decreased hearing on both sides.  Feels like he is in a bucket and underwater.  His areas are crackling.  Has a mild cough.  Some continued congestion.  Otherwise  "feels well.  Is frustrated by decreased hearing and worries about permanent damage to his ear.        Lab Results   Component Value Date    A1C 8.9 12/26/2022         Review of Systems   As per HPI       Objective    /70 (BP Location: Right arm, Patient Position: Sitting, Cuff Size: Adult Large)   Pulse 80   Temp 98.8  F (37.1  C) (Oral)   Resp 20   Ht 1.854 m (6' 1\")   Wt 138.1 kg (304 lb 6.4 oz)   SpO2 96%   BMI 40.16 kg/m    Body mass index is 40.16 kg/m .  Physical Exam   GENERAL: healthy, alert and no distress  EYES: Eyes grossly normal to inspection, PERRL and conjunctivae and sclerae normal  HENT: ear canals and TM's normal with fluid behind both ears.  No evidence of otitis media, perforation.  No evidence of otitis externa. nose and mouth without ulcers or lesions. (+) Bilateral nasal turbinate hypertrophy.  NECK: no adenopathy, no asymmetry, masses, or scars and thyroid normal to palpation  RESP: lungs clear to auscultation - no rales, rhonchi or wheezes  CV: regular rate and rhythm, no murmur, click or rub, no peripheral edema and peripheral pulses strong                      "

## 2023-12-05 NOTE — TELEPHONE ENCOUNTER
Shay from [facility] calling, needing to know about pts Flonase, it says twice daily for 1 wk, is it 1 spray per nostril? Or 2 sprays? Please call back at [phone] when reviewed

## 2023-12-06 NOTE — TELEPHONE ENCOUNTER
Writer called and spoke with pharmacist and relayed per PCP. Instructions for Flonase should be- 1 spray in each nostril twice daily for 7 days.

## 2024-01-02 ENCOUNTER — TRANSFERRED RECORDS (OUTPATIENT)
Dept: MULTI SPECIALTY CLINIC | Facility: CLINIC | Age: 49
End: 2024-01-02

## 2024-01-02 LAB — RETINOPATHY: NORMAL

## 2024-02-11 ENCOUNTER — HEALTH MAINTENANCE LETTER (OUTPATIENT)
Age: 49
End: 2024-02-11

## 2024-02-28 DIAGNOSIS — E11.65 TYPE 2 DIABETES MELLITUS WITH HYPERGLYCEMIA, WITHOUT LONG-TERM CURRENT USE OF INSULIN (H): ICD-10-CM

## 2024-02-28 NOTE — TELEPHONE ENCOUNTER
He is taking 500mg daily or 1000mg daily?  Please fill a 1 month supply of whichever dose he is taking.  No further refills until he is seen    Dr. Padmaja Harrington, DO

## 2024-02-28 NOTE — TELEPHONE ENCOUNTER
The patient needs refill of Metformin.  The patient is leaving out of town on Monday.  He was advised he would need appt.  Can he get small supply until he can get appt?    The patient reports he only takes once a day.  It gave him some stomach issues.     Please review and advise.    Thank you    Catherine ZHONG RN

## 2024-02-29 RX ORDER — METFORMIN HCL 500 MG
1000 TABLET, EXTENDED RELEASE 24 HR ORAL DAILY
Qty: 60 TABLET | Refills: 0 | Status: SHIPPED | OUTPATIENT
Start: 2024-02-29 | End: 2024-03-04

## 2024-02-29 NOTE — TELEPHONE ENCOUNTER
RN tried to reach patient.   No answer.   LVM to call back to [phone].   Dipika Cruz RN on 2/29/2024 at 12:03 PM

## 2024-02-29 NOTE — TELEPHONE ENCOUNTER
Patient called back. He stated originally Dr. Harrington wanted him to take 4 tablets a day but he wasn't tolerating it well. He has been taking 2 tablets a day.   RN scheduled patient for a preventative and DM visit.  Will send in a month supply of metformin.     Dipika Cruz RN on 2/29/2024 at 1:20 PM

## 2024-02-29 NOTE — TELEPHONE ENCOUNTER
Filled by RN per Providers request.    Requested Prescriptions   Pending Prescriptions Disp Refills    metFORMIN (GLUCOPHAGE XR) 500 MG 24 hr tablet 60 tablet 0     Sig: Take 2 tablets (1,000 mg) by mouth daily       Biguanide Agents Failed - 2/28/2024  4:15 PM        Failed - Patient has documented A1c within the specified period of time.     If HgbA1C is 8 or greater, it needs to be on file within the past 3 months.  If less than 8, must be on file within the past 6 months.     Recent Labs   Lab Test 12/26/22  1000   A1C 8.9*             Failed - Recent (6 mo) or future (90 days) visit within the authorizing provider's specialty     The patient must have completed an in-person or virtual visit within the past 6 months or has a future visit scheduled within the next 90 days with the authorizing provider s specialty.  Urgent care and e-visits do not quality as an office visit for this protocol.          Passed - Patient is age 10 or older        Passed - Patient does NOT have a diagnosis of CHF.        Passed - Medication is active on med list        Passed - Medication indicated for associated diagnosis     Medication is associated with one or more of the following diagnoses:     Gestational diabetes mellitus     Hyperinsulinar obesity     Hypersecretion of ovarian androgens    Non-alcoholic fatty liver    Polycystic ovarian syndrome               Pre-diabetes (DM 2 prevention)    Type 2 diabetes mellitus     Weight gain, antipsychotic therapy-induced             Passed - Has GFR on file in past 12 months and most recent value is normal                 Dipika Cruz RN 02/29/24 1:21 PM

## 2024-03-02 DIAGNOSIS — E11.65 TYPE 2 DIABETES MELLITUS WITH HYPERGLYCEMIA, WITHOUT LONG-TERM CURRENT USE OF INSULIN (H): ICD-10-CM

## 2024-03-04 RX ORDER — METFORMIN HCL 500 MG
TABLET, EXTENDED RELEASE 24 HR ORAL
Qty: 120 TABLET | Refills: 0 | OUTPATIENT
Start: 2024-03-04

## 2024-03-04 RX ORDER — METFORMIN HCL 500 MG
1000 TABLET, EXTENDED RELEASE 24 HR ORAL DAILY
Qty: 60 TABLET | Refills: 0 | Status: SHIPPED | OUTPATIENT
Start: 2024-03-04 | End: 2024-03-19

## 2024-03-04 NOTE — TELEPHONE ENCOUNTER
Patient's wife's call transferred to author  Patient in need of refill of Metformin as soon as possible   Due to electronic transmission issues and location of [facility], patient and patient's wife requesting medication go to [facility] in [place]     Refill sent to [facility] in [place] as requested    Demian Washburn, Clinic RN  [facility]

## 2024-03-08 DIAGNOSIS — E11.65 TYPE 2 DIABETES MELLITUS WITH HYPERGLYCEMIA, WITHOUT LONG-TERM CURRENT USE OF INSULIN (H): ICD-10-CM

## 2024-03-08 RX ORDER — METFORMIN HCL 500 MG
TABLET, EXTENDED RELEASE 24 HR ORAL
Qty: 240 TABLET | Refills: 0 | OUTPATIENT
Start: 2024-03-08

## 2024-03-18 NOTE — PATIENT INSTRUCTIONS
Preventive Care Advice   This is general advice given by our system to help you stay healthy. However, your care team may have specific advice just for you. Please talk to your care team about your preventive care needs.  Nutrition  Eat 5 or more servings of fruits and vegetables each day.  Try wheat bread, brown rice and whole grain pasta (instead of white bread, rice, and pasta).  Get enough calcium and vitamin D. Check the label on foods and aim for 100% of the RDA (recommended daily allowance).  Lifestyle  Exercise at least 150 minutes each week   (30 minutes a day, 5 days a week).  Do muscle strengthening activities 2 days a week. These help control your weight and prevent disease.  No smoking.  Wear sunscreen to prevent skin cancer.  Have a dental exam and cleaning every 6 months.  Yearly exams  See your health care team every year to talk about:  Any changes in your health.  Any medicines your care team has prescribed.  Preventive care, family planning, and ways to prevent chronic diseases.  Shots (vaccines)   HPV shots (up to age 26), if you've never had them before.  Hepatitis B shots (up to age 59), if you've never had them before.  COVID-19 shot: Get this shot when it's due.  Flu shot: Get a flu shot every year.  Tetanus shot: Get a tetanus shot every 10 years.  Pneumococcal, hepatitis A, and RSV shots: Ask your care team if you need these based on your risk.  Shingles shot (for age 50 and up).  General health tests  Diabetes screening:  Starting at age 35, Get screened for diabetes at least every 3 years.  If you are younger than age 35, ask your care team if you should be screened for diabetes.  Cholesterol test: At age 39, start having a cholesterol test every 5 years, or more often if advised.  Bone density scan (DEXA): At age 50, ask your care team if you should have this scan for osteoporosis (brittle bones).  Hepatitis C: Get tested at least once in your life.  STIs (sexually transmitted  infections)  Before age 24: Ask your care team if you should be screened for STIs.  After age 24: Get screened for STIs if you're at risk. You are at risk for STIs (including HIV) if:  You are sexually active with more than one person.  You don't use condoms every time.  You or a partner was diagnosed with a sexually transmitted infection.  If you are at risk for HIV, ask about PrEP medicine to prevent HIV.  Get tested for HIV at least once in your life, whether you are at risk for HIV or not.  Cancer screening tests  Cervical cancer screening: If you have a cervix, begin getting regular cervical cancer screening tests at age 21. Most people who have regular screenings with normal results can stop after age 65. Talk about this with your provider.  Breast cancer scan (mammogram): If you've ever had breasts, begin having regular mammograms starting at age 40. This is a scan to check for breast cancer.  Colon cancer screening: It is important to start screening for colon cancer at age 45.  Have a colonoscopy test every 10 years (or more often if you're at risk) Or, ask your provider about stool tests like a FIT test every year or Cologuard test every 3 years.  To learn more about your testing options, visit: https://www.eLearning Connections/360585.pdf.  For help making a decision, visit: https://bit.ly/vh33191.  Prostate cancer screening test: If you have a prostate and are age 55 to 69, ask your provider if you would benefit from a yearly prostate cancer screening test.  Lung cancer screening: If you are a current or former smoker age 50 to 80, ask your care team if ongoing lung cancer screenings are right for you.  For informational purposes only. Not to replace the advice of your health care provider. Copyright   2023 [employer]. All rights reserved. Clinically reviewed by the [facility] Transitions Program. YouLicense [id number] - REV 01/24.

## 2024-03-19 ENCOUNTER — OFFICE VISIT (OUTPATIENT)
Dept: FAMILY MEDICINE | Facility: CLINIC | Age: 49
End: 2024-03-19
Payer: COMMERCIAL

## 2024-03-19 VITALS
TEMPERATURE: 97.2 F | SYSTOLIC BLOOD PRESSURE: 138 MMHG | RESPIRATION RATE: 16 BRPM | HEIGHT: 73 IN | BODY MASS INDEX: 40.93 KG/M2 | OXYGEN SATURATION: 98 % | WEIGHT: 308.8 LBS | DIASTOLIC BLOOD PRESSURE: 88 MMHG | HEART RATE: 102 BPM

## 2024-03-19 DIAGNOSIS — Z12.11 SCREEN FOR COLON CANCER: ICD-10-CM

## 2024-03-19 DIAGNOSIS — Z13.6 CARDIOVASCULAR SCREENING; LDL GOAL LESS THAN 160: ICD-10-CM

## 2024-03-19 DIAGNOSIS — E78.2 MIXED HYPERLIPIDEMIA: ICD-10-CM

## 2024-03-19 DIAGNOSIS — Z00.00 ROUTINE GENERAL MEDICAL EXAMINATION AT A HEALTH CARE FACILITY: Primary | ICD-10-CM

## 2024-03-19 DIAGNOSIS — E11.65 TYPE 2 DIABETES MELLITUS WITH HYPERGLYCEMIA, WITHOUT LONG-TERM CURRENT USE OF INSULIN (H): ICD-10-CM

## 2024-03-19 DIAGNOSIS — E66.01 MORBID OBESITY (H): ICD-10-CM

## 2024-03-19 LAB
ALBUMIN SERPL BCG-MCNC: 4.5 G/DL (ref 3.5–5.2)
ALP SERPL-CCNC: 77 U/L (ref 40–150)
ALT SERPL W P-5'-P-CCNC: 62 U/L (ref 0–70)
ANION GAP SERPL CALCULATED.3IONS-SCNC: 10 MMOL/L (ref 7–15)
AST SERPL W P-5'-P-CCNC: 29 U/L (ref 0–45)
BILIRUB SERPL-MCNC: 0.3 MG/DL
BUN SERPL-MCNC: 18.4 MG/DL (ref 6–20)
CALCIUM SERPL-MCNC: 9.4 MG/DL (ref 8.6–10)
CHLORIDE SERPL-SCNC: 99 MMOL/L (ref 98–107)
CHOLEST SERPL-MCNC: 187 MG/DL
CREAT SERPL-MCNC: 0.99 MG/DL (ref 0.67–1.17)
DEPRECATED HCO3 PLAS-SCNC: 27 MMOL/L (ref 22–29)
EGFRCR SERPLBLD CKD-EPI 2021: >90 ML/MIN/1.73M2
FASTING STATUS PATIENT QL REPORTED: YES
GLUCOSE SERPL-MCNC: 144 MG/DL (ref 70–99)
HBA1C MFR BLD: 7.1 % (ref 0–5.6)
HDLC SERPL-MCNC: 29 MG/DL
LDLC SERPL CALC-MCNC: ABNORMAL MG/DL
NONHDLC SERPL-MCNC: 158 MG/DL
POTASSIUM SERPL-SCNC: 4.3 MMOL/L (ref 3.4–5.3)
PROT SERPL-MCNC: 7.6 G/DL (ref 6.4–8.3)
SODIUM SERPL-SCNC: 136 MMOL/L (ref 135–145)
TRIGL SERPL-MCNC: 435 MG/DL

## 2024-03-19 PROCEDURE — 80061 LIPID PANEL: CPT | Performed by: FAMILY MEDICINE

## 2024-03-19 PROCEDURE — 80053 COMPREHEN METABOLIC PANEL: CPT | Performed by: FAMILY MEDICINE

## 2024-03-19 PROCEDURE — 99214 OFFICE O/P EST MOD 30 MIN: CPT | Mod: 25 | Performed by: FAMILY MEDICINE

## 2024-03-19 PROCEDURE — 99207 PR FOOT EXAM NO CHARGE: CPT | Performed by: FAMILY MEDICINE

## 2024-03-19 PROCEDURE — 83036 HEMOGLOBIN GLYCOSYLATED A1C: CPT | Performed by: FAMILY MEDICINE

## 2024-03-19 PROCEDURE — 36415 COLL VENOUS BLD VENIPUNCTURE: CPT | Performed by: FAMILY MEDICINE

## 2024-03-19 PROCEDURE — 99396 PREV VISIT EST AGE 40-64: CPT | Performed by: FAMILY MEDICINE

## 2024-03-19 PROCEDURE — 83721 ASSAY OF BLOOD LIPOPROTEIN: CPT | Performed by: FAMILY MEDICINE

## 2024-03-19 RX ORDER — METFORMIN HCL 500 MG
1500 TABLET, EXTENDED RELEASE 24 HR ORAL DAILY
Qty: 270 TABLET | Refills: 0 | Status: SHIPPED | OUTPATIENT
Start: 2024-03-19 | End: 2024-10-04

## 2024-03-19 SDOH — HEALTH STABILITY: PHYSICAL HEALTH: ON AVERAGE, HOW MANY DAYS PER WEEK DO YOU ENGAGE IN MODERATE TO STRENUOUS EXERCISE (LIKE A BRISK WALK)?: 1 DAY

## 2024-03-19 ASSESSMENT — SOCIAL DETERMINANTS OF HEALTH (SDOH): HOW OFTEN DO YOU GET TOGETHER WITH FRIENDS OR RELATIVES?: ONCE A WEEK

## 2024-03-20 LAB — LDLC SERPL DIRECT ASSAY-MCNC: 101 MG/DL

## 2024-03-22 ENCOUNTER — MYC MEDICAL ADVICE (OUTPATIENT)
Dept: FAMILY MEDICINE | Facility: CLINIC | Age: 49
End: 2024-03-22
Payer: COMMERCIAL

## 2024-03-22 DIAGNOSIS — E78.2 MIXED HYPERLIPIDEMIA: ICD-10-CM

## 2024-03-22 DIAGNOSIS — E11.65 TYPE 2 DIABETES MELLITUS WITH HYPERGLYCEMIA, WITHOUT LONG-TERM CURRENT USE OF INSULIN (H): Primary | ICD-10-CM

## 2024-05-10 ENCOUNTER — DOCUMENTATION ONLY (OUTPATIENT)
Dept: SLEEP MEDICINE | Facility: CLINIC | Age: 49
End: 2024-05-10

## 2024-05-10 ENCOUNTER — DOCUMENTATION ONLY (OUTPATIENT)
Dept: SLEEP MEDICINE | Facility: CLINIC | Age: 49
End: 2024-05-10
Payer: COMMERCIAL

## 2024-05-10 DIAGNOSIS — G47.33 OBSTRUCTIVE SLEEP APNEA (ADULT) (PEDIATRIC): Primary | ICD-10-CM

## 2024-06-29 ENCOUNTER — HEALTH MAINTENANCE LETTER (OUTPATIENT)
Age: 49
End: 2024-06-29

## 2024-10-04 ENCOUNTER — OFFICE VISIT (OUTPATIENT)
Dept: FAMILY MEDICINE | Facility: CLINIC | Age: 49
End: 2024-10-04
Payer: COMMERCIAL

## 2024-10-04 VITALS
WEIGHT: 313 LBS | OXYGEN SATURATION: 96 % | HEIGHT: 74 IN | SYSTOLIC BLOOD PRESSURE: 144 MMHG | RESPIRATION RATE: 20 BRPM | TEMPERATURE: 97.5 F | HEART RATE: 85 BPM | BODY MASS INDEX: 40.17 KG/M2 | DIASTOLIC BLOOD PRESSURE: 86 MMHG

## 2024-10-04 DIAGNOSIS — G47.33 OSA ON CPAP: ICD-10-CM

## 2024-10-04 DIAGNOSIS — E66.01 MORBID OBESITY (H): ICD-10-CM

## 2024-10-04 DIAGNOSIS — I10 ESSENTIAL HYPERTENSION: Primary | ICD-10-CM

## 2024-10-04 DIAGNOSIS — E11.65 TYPE 2 DIABETES MELLITUS WITH HYPERGLYCEMIA, WITHOUT LONG-TERM CURRENT USE OF INSULIN (H): ICD-10-CM

## 2024-10-04 LAB
ALBUMIN SERPL BCG-MCNC: 4.5 G/DL (ref 3.5–5.2)
ALP SERPL-CCNC: 74 U/L (ref 40–150)
ALT SERPL W P-5'-P-CCNC: 56 U/L (ref 0–70)
ANION GAP SERPL CALCULATED.3IONS-SCNC: 12 MMOL/L (ref 7–15)
AST SERPL W P-5'-P-CCNC: 32 U/L (ref 0–45)
BILIRUB SERPL-MCNC: 0.4 MG/DL
BUN SERPL-MCNC: 19.1 MG/DL (ref 6–20)
CALCIUM SERPL-MCNC: 9.4 MG/DL (ref 8.8–10.4)
CHLORIDE SERPL-SCNC: 100 MMOL/L (ref 98–107)
CHOLEST SERPL-MCNC: 185 MG/DL
CREAT SERPL-MCNC: 0.97 MG/DL (ref 0.67–1.17)
CREAT UR-MCNC: 122 MG/DL
EGFRCR SERPLBLD CKD-EPI 2021: >90 ML/MIN/1.73M2
ERYTHROCYTE [DISTWIDTH] IN BLOOD BY AUTOMATED COUNT: 12.8 % (ref 10–15)
EST. AVERAGE GLUCOSE BLD GHB EST-MCNC: 169 MG/DL
FASTING STATUS PATIENT QL REPORTED: ABNORMAL
FASTING STATUS PATIENT QL REPORTED: ABNORMAL
GLUCOSE SERPL-MCNC: 149 MG/DL (ref 70–99)
HBA1C MFR BLD: 7.5 % (ref 0–5.6)
HCO3 SERPL-SCNC: 24 MMOL/L (ref 22–29)
HCT VFR BLD AUTO: 43.5 % (ref 40–53)
HDLC SERPL-MCNC: 28 MG/DL
HGB BLD-MCNC: 15.2 G/DL (ref 13.3–17.7)
LDLC SERPL CALC-MCNC: ABNORMAL MG/DL
LDLC SERPL DIRECT ASSAY-MCNC: 101 MG/DL
MCH RBC QN AUTO: 30.2 PG (ref 26.5–33)
MCHC RBC AUTO-ENTMCNC: 34.9 G/DL (ref 31.5–36.5)
MCV RBC AUTO: 87 FL (ref 78–100)
MICROALBUMIN UR-MCNC: <12 MG/L
MICROALBUMIN/CREAT UR: NORMAL MG/G{CREAT}
NONHDLC SERPL-MCNC: 157 MG/DL
PLATELET # BLD AUTO: 205 10E3/UL (ref 150–450)
POTASSIUM SERPL-SCNC: 5.1 MMOL/L (ref 3.4–5.3)
PROT SERPL-MCNC: 7.7 G/DL (ref 6.4–8.3)
RBC # BLD AUTO: 5.03 10E6/UL (ref 4.4–5.9)
SODIUM SERPL-SCNC: 136 MMOL/L (ref 135–145)
TRIGL SERPL-MCNC: 404 MG/DL
WBC # BLD AUTO: 7.5 10E3/UL (ref 4–11)

## 2024-10-04 PROCEDURE — 83721 ASSAY OF BLOOD LIPOPROTEIN: CPT | Performed by: STUDENT IN AN ORGANIZED HEALTH CARE EDUCATION/TRAINING PROGRAM

## 2024-10-04 PROCEDURE — 80053 COMPREHEN METABOLIC PANEL: CPT | Performed by: STUDENT IN AN ORGANIZED HEALTH CARE EDUCATION/TRAINING PROGRAM

## 2024-10-04 PROCEDURE — 36415 COLL VENOUS BLD VENIPUNCTURE: CPT | Performed by: STUDENT IN AN ORGANIZED HEALTH CARE EDUCATION/TRAINING PROGRAM

## 2024-10-04 PROCEDURE — 99215 OFFICE O/P EST HI 40 MIN: CPT | Performed by: STUDENT IN AN ORGANIZED HEALTH CARE EDUCATION/TRAINING PROGRAM

## 2024-10-04 PROCEDURE — 80061 LIPID PANEL: CPT | Performed by: STUDENT IN AN ORGANIZED HEALTH CARE EDUCATION/TRAINING PROGRAM

## 2024-10-04 PROCEDURE — 83036 HEMOGLOBIN GLYCOSYLATED A1C: CPT | Performed by: STUDENT IN AN ORGANIZED HEALTH CARE EDUCATION/TRAINING PROGRAM

## 2024-10-04 PROCEDURE — 82043 UR ALBUMIN QUANTITATIVE: CPT | Performed by: STUDENT IN AN ORGANIZED HEALTH CARE EDUCATION/TRAINING PROGRAM

## 2024-10-04 PROCEDURE — 82570 ASSAY OF URINE CREATININE: CPT | Performed by: STUDENT IN AN ORGANIZED HEALTH CARE EDUCATION/TRAINING PROGRAM

## 2024-10-04 PROCEDURE — G2211 COMPLEX E/M VISIT ADD ON: HCPCS | Performed by: STUDENT IN AN ORGANIZED HEALTH CARE EDUCATION/TRAINING PROGRAM

## 2024-10-04 PROCEDURE — 85027 COMPLETE CBC AUTOMATED: CPT | Performed by: STUDENT IN AN ORGANIZED HEALTH CARE EDUCATION/TRAINING PROGRAM

## 2024-10-04 RX ORDER — LANCETS
EACH MISCELLANEOUS
Qty: 100 EACH | Refills: 11 | Status: SHIPPED | OUTPATIENT
Start: 2024-10-04

## 2024-10-04 RX ORDER — LOSARTAN POTASSIUM 25 MG/1
25 TABLET ORAL DAILY
Qty: 90 TABLET | Refills: 0 | Status: SHIPPED | OUTPATIENT
Start: 2024-10-04

## 2024-10-04 RX ORDER — METFORMIN HYDROCHLORIDE 500 MG/1
500 TABLET, EXTENDED RELEASE ORAL 2 TIMES DAILY WITH MEALS
Qty: 180 TABLET | Refills: 0 | Status: SHIPPED | OUTPATIENT
Start: 2024-10-04

## 2024-10-04 RX ORDER — ATORVASTATIN CALCIUM 40 MG/1
40 TABLET, FILM COATED ORAL DAILY
Qty: 90 TABLET | Refills: 3 | Status: SHIPPED | OUTPATIENT
Start: 2024-10-04

## 2024-10-04 ASSESSMENT — PAIN SCALES - GENERAL: PAINLEVEL: NO PAIN (0)

## 2024-10-04 NOTE — PROGRESS NOTES
Assessment & Plan     There are no diagnoses linked to this encounter.      Extensive counseling regarding diabetes down in the room.  Reviewed pathophysiology of type 2 diabetes, risk factors for progression, importance of diet and exercise for prevention of progression, dangers of uncontrolled diabetes including increased risk for heart attack/stroke, multi organ damage and loss of limb.  Reviewed that the best long-term plan for management of diabetes is moderation.  If he adopt a diet that is too restrictive, it will not be sustainable.  Would try to eat complex carbs, low-fat and high-protein diet.  Will work on portion control, increasing cardiovascular exercise.  Should focus on doing a largely plant-based diet.  Avoid sugary beverages  He is on his CPAP consistently.  Congratulated him on that  Reviewed that we will recheck A1c and titrate medications accordingly.  He is only able to tolerate 2 pills of metformin daily.  His A1c goal is between 6-7  Would like to start him on a moderate intensity statin.  This may also help with his triglycerides.  He is amenable.  He is already on an OTC fish oil supplement  His blood pressure is elevated today and has been borderline/elevated at several visits since 2022.  Will also start him on a low-dose losartan.  Is due for several vaccines.  He is unsure about PCV 20 quite yet.  He would like to discuss at the next visit.  He wants to hold off on COVID.  He will get the flu shot at the pharmacy with his family.  Would like him to check his morning fasting sugars consistently  We will follow-up in 3 months  He does have an appointment coming up with diabetes education in January  Is up-to-date on his diabetic eye exam and denies any endorgan damage      The longitudinal plan of care for the diagnosis(es)/condition(s) as documented were addressed during this visit. Due to the added complexity in care, I will continue to support Dion in the subsequent management and  "with ongoing continuity of care.          40 minutes spent by me on the date of the encounter doing chart review, history and exam, documentation and further activities per the note    Subjective   Dion is a 49 year old, presenting for the following health issues:  Diabetes      10/4/2024     9:19 AM   Additional Questions   Roomed by gamal         Patient presented today potentially requesting to establish care with me.    Patient would like to check about his diabetes diagnosis.  He was diagnosed back in 2022 which was \"surprising to him\".  He did not go to the doctors very much during COVID.  Attempted to manage with lifestyle and metformin.  However, he does not feel like the metformin has been effective.  He has not been able to tolerate it.  He is also been frustrated by lack of information about his diagnosis and how to optimally manage it.  He feels like he is a bit overwhelmed by the diagnosis and feels like he does not have any information about \"what the right way to eat is\".  Ideally, he would like to not be on any medication\" reverse his diabetes\".  However, he has not had as much success with trying things on his own.    He feels like he wants a definitive plan moving forward    He is also worried about his triglycerides and cholesterol levels and he like to address that          Review of Systems  As per HPI       Objective    BP (!) 144/86 (BP Location: Right arm, Patient Position: Sitting)   Pulse 85   Temp 97.5  F (36.4  C) (Oral)   Resp 20   Ht 1.88 m (6' 2\")   Wt 142 kg (313 lb)   SpO2 96%   BMI 40.19 kg/m    Body mass index is 40.19 kg/m .  Physical Exam   GENERAL: alert and no distress, obese  PSYCH: mentation appears normal, affect anxious       Signed Electronically by: Laurita Robin DO      "

## 2024-10-10 ENCOUNTER — MYC REFILL (OUTPATIENT)
Dept: FAMILY MEDICINE | Facility: CLINIC | Age: 49
End: 2024-10-10
Payer: COMMERCIAL

## 2024-10-10 DIAGNOSIS — I10 ESSENTIAL HYPERTENSION: ICD-10-CM

## 2024-10-10 DIAGNOSIS — E11.65 TYPE 2 DIABETES MELLITUS WITH HYPERGLYCEMIA, WITHOUT LONG-TERM CURRENT USE OF INSULIN (H): ICD-10-CM

## 2024-10-11 RX ORDER — LOSARTAN POTASSIUM 25 MG/1
25 TABLET ORAL DAILY
Qty: 90 TABLET | Refills: 0 | OUTPATIENT
Start: 2024-10-11

## 2024-10-11 RX ORDER — METFORMIN HYDROCHLORIDE 500 MG/1
500 TABLET, EXTENDED RELEASE ORAL 2 TIMES DAILY WITH MEALS
Qty: 180 TABLET | Refills: 0 | OUTPATIENT
Start: 2024-10-11

## 2024-10-11 RX ORDER — ATORVASTATIN CALCIUM 40 MG/1
40 TABLET, FILM COATED ORAL DAILY
Qty: 90 TABLET | Refills: 3 | OUTPATIENT
Start: 2024-10-11

## 2024-11-14 ENCOUNTER — MYC REFILL (OUTPATIENT)
Dept: FAMILY MEDICINE | Facility: CLINIC | Age: 49
End: 2024-11-14
Payer: COMMERCIAL

## 2024-11-14 DIAGNOSIS — E11.65 TYPE 2 DIABETES MELLITUS WITH HYPERGLYCEMIA, WITHOUT LONG-TERM CURRENT USE OF INSULIN (H): ICD-10-CM

## 2024-11-14 RX ORDER — METFORMIN HYDROCHLORIDE 500 MG/1
500 TABLET, EXTENDED RELEASE ORAL 2 TIMES DAILY WITH MEALS
Qty: 180 TABLET | Refills: 0 | Status: SHIPPED | OUTPATIENT
Start: 2024-11-14

## 2024-11-27 DIAGNOSIS — I10 ESSENTIAL HYPERTENSION: ICD-10-CM

## 2024-11-27 RX ORDER — LOSARTAN POTASSIUM 25 MG/1
25 TABLET ORAL DAILY
Qty: 90 TABLET | Refills: 1 | Status: SHIPPED | OUTPATIENT
Start: 2024-11-27

## 2024-11-27 NOTE — TELEPHONE ENCOUNTER
FAX [facility] Refill Request    Patient is requesting 90 Day RX       Disp Refills Start End RADHA    losartan (COZAAR) 25 MG tablet 90 tablet 0 10/4/2024 -- No   Sig - Route: Take 1 tablet (25 mg) by mouth daily. - Oral   Sent to pharmacy as: Losartan Potassium 25 MG Oral Tablet (COZAAR)   Previous RX sent to:  [facility] [id number] [facility] - [place], [place] - [address]

## 2024-12-18 ENCOUNTER — MYC MEDICAL ADVICE (OUTPATIENT)
Dept: FAMILY MEDICINE | Facility: CLINIC | Age: 49
End: 2024-12-18
Payer: COMMERCIAL

## 2024-12-19 ENCOUNTER — HOSPITAL ENCOUNTER (EMERGENCY)
Facility: HOSPITAL | Age: 49
Discharge: HOME | End: 2024-12-19
Attending: EMERGENCY MEDICINE
Payer: COMMERCIAL

## 2024-12-19 ENCOUNTER — APPOINTMENT (OUTPATIENT)
Dept: RADIOLOGY | Facility: HOSPITAL | Age: 49
End: 2024-12-19
Attending: EMERGENCY MEDICINE
Payer: COMMERCIAL

## 2024-12-19 VITALS
SYSTOLIC BLOOD PRESSURE: 120 MMHG | OXYGEN SATURATION: 97 % | HEART RATE: 72 BPM | BODY MASS INDEX: 40.63 KG/M2 | TEMPERATURE: 98.6 F | WEIGHT: 306.6 LBS | DIASTOLIC BLOOD PRESSURE: 72 MMHG | HEIGHT: 73 IN | RESPIRATION RATE: 15 BRPM

## 2024-12-19 DIAGNOSIS — R00.2 PALPITATIONS: ICD-10-CM

## 2024-12-19 LAB
ANION GAP SERPL CALCULATED.3IONS-SCNC: 11 MMOL/L (ref 7–15)
BASOPHILS # BLD AUTO: 0.1 10E3/UL (ref 0–0.2)
BASOPHILS NFR BLD AUTO: 1 %
BUN SERPL-MCNC: 19.1 MG/DL (ref 6–20)
CALCIUM SERPL-MCNC: 9.3 MG/DL (ref 8.8–10.4)
CHLORIDE SERPL-SCNC: 100 MMOL/L (ref 98–107)
CREAT SERPL-MCNC: 0.98 MG/DL (ref 0.67–1.17)
EGFRCR SERPLBLD CKD-EPI 2021: >90 ML/MIN/1.73M2
EOSINOPHIL # BLD AUTO: 0.2 10E3/UL (ref 0–0.7)
EOSINOPHIL NFR BLD AUTO: 2 %
ERYTHROCYTE [DISTWIDTH] IN BLOOD BY AUTOMATED COUNT: 12.7 % (ref 10–15)
GLUCOSE SERPL-MCNC: 181 MG/DL (ref 70–99)
HCO3 SERPL-SCNC: 24 MMOL/L (ref 22–29)
HCT VFR BLD AUTO: 42.9 % (ref 40–53)
HGB BLD-MCNC: 14.8 G/DL (ref 13.3–17.7)
HOLD SPECIMEN: NORMAL
IMM GRANULOCYTES # BLD: 0.1 10E3/UL
IMM GRANULOCYTES NFR BLD: 1 %
LYMPHOCYTES # BLD AUTO: 3 10E3/UL (ref 0.8–5.3)
LYMPHOCYTES NFR BLD AUTO: 29 %
MAGNESIUM SERPL-MCNC: 1.9 MG/DL (ref 1.7–2.3)
MCH RBC QN AUTO: 30 PG (ref 26.5–33)
MCHC RBC AUTO-ENTMCNC: 34.5 G/DL (ref 31.5–36.5)
MCV RBC AUTO: 87 FL (ref 78–100)
MONOCYTES # BLD AUTO: 0.6 10E3/UL (ref 0–1.3)
MONOCYTES NFR BLD AUTO: 6 %
NEUTROPHILS # BLD AUTO: 6.3 10E3/UL (ref 1.6–8.3)
NEUTROPHILS NFR BLD AUTO: 62 %
NRBC # BLD AUTO: 0 10E3/UL
NRBC BLD AUTO-RTO: 0 /100
PLATELET # BLD AUTO: 204 10E3/UL (ref 150–450)
POTASSIUM SERPL-SCNC: 4.5 MMOL/L (ref 3.4–5.3)
RBC # BLD AUTO: 4.93 10E6/UL (ref 4.4–5.9)
SODIUM SERPL-SCNC: 135 MMOL/L (ref 135–145)
TROPONIN T SERPL HS-MCNC: 8 NG/L
TROPONIN T SERPL HS-MCNC: 8 NG/L
TSH SERPL DL<=0.005 MIU/L-ACNC: 1.41 UIU/ML (ref 0.3–4.2)
WBC # BLD AUTO: 10.2 10E3/UL (ref 4–11)

## 2024-12-19 PROCEDURE — 83735 ASSAY OF MAGNESIUM: CPT | Performed by: EMERGENCY MEDICINE

## 2024-12-19 PROCEDURE — 36415 COLL VENOUS BLD VENIPUNCTURE: CPT | Performed by: EMERGENCY MEDICINE

## 2024-12-19 PROCEDURE — 71046 X-RAY EXAM CHEST 2 VIEWS: CPT

## 2024-12-19 PROCEDURE — 85004 AUTOMATED DIFF WBC COUNT: CPT | Performed by: EMERGENCY MEDICINE

## 2024-12-19 PROCEDURE — 80048 BASIC METABOLIC PNL TOTAL CA: CPT | Performed by: EMERGENCY MEDICINE

## 2024-12-19 PROCEDURE — 84484 ASSAY OF TROPONIN QUANT: CPT | Performed by: EMERGENCY MEDICINE

## 2024-12-19 PROCEDURE — 84443 ASSAY THYROID STIM HORMONE: CPT | Performed by: EMERGENCY MEDICINE

## 2024-12-19 PROCEDURE — 99285 EMERGENCY DEPT VISIT HI MDM: CPT | Mod: 25 | Performed by: EMERGENCY MEDICINE

## 2024-12-19 PROCEDURE — 93005 ELECTROCARDIOGRAM TRACING: CPT | Performed by: EMERGENCY MEDICINE

## 2024-12-19 PROCEDURE — 85041 AUTOMATED RBC COUNT: CPT | Performed by: EMERGENCY MEDICINE

## 2024-12-19 ASSESSMENT — ACTIVITIES OF DAILY LIVING (ADL)
ADLS_ACUITY_SCORE: 41

## 2024-12-19 ASSESSMENT — COLUMBIA-SUICIDE SEVERITY RATING SCALE - C-SSRS
2. HAVE YOU ACTUALLY HAD ANY THOUGHTS OF KILLING YOURSELF IN THE PAST MONTH?: NO
6. HAVE YOU EVER DONE ANYTHING, STARTED TO DO ANYTHING, OR PREPARED TO DO ANYTHING TO END YOUR LIFE?: NO
1. IN THE PAST MONTH, HAVE YOU WISHED YOU WERE DEAD OR WISHED YOU COULD GO TO SLEEP AND NOT WAKE UP?: NO

## 2024-12-19 NOTE — ED TRIAGE NOTES
"Has been having intermittent periods of feeling irregular heart beat for last month.  These episodes will last approximately 1-5 minutes. These episodes appear to be \"random\".  But today, these episodes have happened x2 and have been for longer periods of time.  The second time it happened, he states \"it took my breath away\".      Denies chest pain.         "

## 2024-12-19 NOTE — ED PROVIDER NOTES
EMERGENCY DEPARTMENT ENCOUNTER      NAME: Dion Tatum  AGE: 49 year old male  YOB: 1975  MRN: [id number]  EVALUATION DATE & TIME: 12/19/2024  3:19 PM    PCP: Laurita Robin    ED PROVIDER: Chaparro Rocha M.D.      Chief Complaint   Patient presents with    Irregular Heart Beat         FINAL IMPRESSION:  1. Palpitations          ED COURSE & MEDICAL DECISION MAKING:    3:25 PM I met with the patient to obtain patient history and performed a physical exam. Discussed plan for ED work up including potential diagnostic studies and interventions.  5:22 PM Repeat exam is benign. Discussed findings and need for repeat troponin.  6:04 PM repeat exam is benign.  Discussed findings and discharge close follow-up.        Pertinent Labs & Imaging studies reviewed. (See chart for details)  49 year old male presents to the Emergency Department for evaluation of palpitations. Patient appears non toxic with stable vitals signs, patient is afebrile with no tachycardia or hypoxia.  Lungs are clear, abdomen is benign, at this time patient is asymptomatic.  Denies any associated chest pain, diaphoresis, vomiting, blood in his urine or stools, change in bowel or bladder habits.  Per review the medical record, did review office visit through [facility] on 10/4/2024 patient was seen by primary care for establishment of care and essentially diabetes diagnosis and counseling.  Patient states that he has been on metformin but also recently started losartan and a statin.  Certainly considered dysrhythmia, electrolyte abnormality, less likely endocrine dysfunction, story is atypical for ACS, nothing to suggest PE or dissection.  Considered CTA imaging of the chest however with normal vitals and exam, resolution of symptoms indication for emergent CT imaging.  We will obtain screening labs, ECG, patient will be placed on cardiac telemetry while in the emergency department, we will obtain chest  x-ray.    Reassessment: Labs by my independent interpretation showed no signs of acute cardiac ischemia with initial and repeat troponin of 8, ECG nonischemic.  No signs of acute kidney injury with a creatinine of 0.89 no signs of anemia with a hemoglobin of 14.8.  Chest x-ray by my independent interpretation showed no focal consolidation by report showed no acute concerning findings.  Repeat exam was benign.  Considered admission for serial troponins however with no chest pain and negative workup, reassuring vitals, feel he is safe for discharge and close follow-up.  Will give contact information placed referral to outpatient cardiology for continued outpatient management evaluation.  Discussed these findings recommendations with the patient felt reassured comfortable discharge.  Return precaution provided.    Medical Decision Making  Obtained supplemental history:Supplemental history obtained?: No  Reviewed external records: External records reviewed?: Inpatient Record: Nicky PCP 10/4/2024, PCP 12/18/2024  Care impacted by chronic illness:Diabetes, Hyperlipidemia, Hypertension, and Other: JULIANE  Did you consider but not order tests?: Work up considered but not performed and documented in chart, if applicable  Did you interpret images independently?: Independent interpretation of ECG and images noted in documentation, when applicable.  Consultation discussion with other provider:Did you involve another provider (consultant, MH, pharmacy, etc.)?: No  Discharge. No recommendations on prescription strength medication(s). See documentation for any additional details.    MIPS: Not Applicable        At the conclusion of the encounter I discussed the results of all of the tests and the disposition. The questions were answered and return precautions provided. The patient or family acknowledged understanding and was agreeable with the care plan.         MEDICATIONS GIVEN IN THE EMERGENCY:  Medications - No data to  "display    NEW PRESCRIPTIONS STARTED AT TODAY'S ER VISIT  Discharge Medication List as of 12/19/2024  6:06 PM               =================================================================    HPI    Patient information was obtained from: patient    Use of Intrepreter: N/A         Dion Tatum is a 49 year old male with pertinent medical history of HTN, HLD, DM2, obesity, JULIANE, who presents palpitations    Patient reports for the past month, he's had intermittent sporadic episodes of \"irregular heart beats/palpitations.\" During these episodes, he feels a \"skipped beat/fluttering\" before the sensation resolves. Notes these episodes are only a few seconds in duration. Since onset, he feels these episodes have been more frequent. Today, he notes 2 episodes where he felt like 2 beats were skipped and had 1 episode where he felt 3-4 beats were skipped. He associates feeling light headed and generally weak with this. Currently, he feels fine now. He denies any cardiac history, Afib, SVT, MI, or CHF. Notes that since October 2024, he's been trying to adjust his HTN and HLD medications (currently on losartan).    He otherwise denies associating chest pain, nausea, vomiting, diarrhea, or fever. There were no other concerns/complaints at this time.    Per chart review:  -12/18/2024: Patient contacted his [facility] PCP clinic regarding intermittent irregular heart beats for the past few weeks with no chest pain. Notes that these episodes have been more frequent lately and wanted an appointment    PAST MEDICAL HISTORY:  Past Medical History:   Diagnosis Date    Chronic fatigue     Hypertension 2015    slightly higher than normal    Obesity     Type 2 diabetes mellitus with hyperglycemia, without long-term current use of insulin (H) 1/15/2023       PAST SURGICAL HISTORY:  Past Surgical History:   Procedure Laterality Date    NO HISTORY OF SURGERY      DONNA LINK           VITALS:  Patient Vitals for the past " "24 hrs:   BP Temp Temp src Pulse Resp SpO2 Height Weight   12/19/24 1800 120/72 -- -- 72 15 97 % -- --   12/19/24 1745 -- -- -- 73 15 96 % -- --   12/19/24 1730 127/67 -- -- 79 19 95 % -- --   12/19/24 1715 -- -- -- 72 11 96 % -- --   12/19/24 1700 126/66 -- -- 76 17 95 % -- --   12/19/24 1645 129/66 -- -- 79 16 94 % -- --   12/19/24 1630 -- -- -- 76 14 94 % -- --   12/19/24 1615 -- -- -- 79 18 95 % -- --   12/19/24 1600 -- -- -- 87 16 96 % -- --   12/19/24 1545 (!) 147/74 -- -- 83 18 95 % -- --   12/19/24 1530 (!) 146/72 -- -- 85 22 96 % -- --   12/19/24 1528 (!) 145/74 -- -- 89 -- 97 % -- --   12/19/24 1514 (!) 202/102 98.6  F (37  C) Oral 90 20 97 % 1.854 m (6' 1\") 139.1 kg (306 lb 9.6 oz)        PHYSICAL EXAM    Constitutional:  Awake, alert, in no apparent distress  HENT:  Normocephalic, Atraumatic. Bilateral external ears normal. Oropharynx moist. Nose normal. Neck- Normal range of motion with no guarding, No midline cervical tenderness, Supple, No stridor.   Eyes:  PERRL, EOMI with no signs of entrapment, Conjunctiva normal, No discharge.   Respiratory:  Normal breath sounds, No respiratory distress, No wheezing.    Cardiovascular:  Normal heart rate, Normal rhythm, No appreciable rubs or gallops.   GI:  Soft, No tenderness, No distension, No palpable masses  Musculoskeletal:  Intact distal pulses, No edema. Good range of motion in all major joints. No tenderness to palpation or major deformities noted.  Integument:  Warm, Dry, No erythema, No rash.   Neurologic:  Alert & oriented, Normal motor function, Normal sensory function, No focal deficits noted.   Psychiatric:  Affect normal, Judgment normal, Mood normal.     LAB:  All pertinent labs reviewed and interpreted.  Results for orders placed or performed during the hospital encounter of 12/19/24   XR Chest 2 Views    Impression    IMPRESSION: Lungs are clear. No pleural effusion. Borderline cardiac enlargement and prominent pulmonary vascularity. Endplate " osteophyte formation thoracic spine.   Basic metabolic panel   Result Value Ref Range    Sodium 135 135 - 145 mmol/L    Potassium 4.5 3.4 - 5.3 mmol/L    Chloride 100 98 - 107 mmol/L    Carbon Dioxide (CO2) 24 22 - 29 mmol/L    Anion Gap 11 7 - 15 mmol/L    Urea Nitrogen 19.1 6.0 - 20.0 mg/dL    Creatinine 0.98 0.67 - 1.17 mg/dL    GFR Estimate >90 >60 mL/min/1.73m2    Calcium 9.3 8.8 - 10.4 mg/dL    Glucose 181 (H) 70 - 99 mg/dL   Result Value Ref Range    Troponin T, High Sensitivity 8 <=22 ng/L   Result Value Ref Range    Magnesium 1.9 1.7 - 2.3 mg/dL   TSH with free T4 reflex   Result Value Ref Range    TSH 1.41 0.30 - 4.20 uIU/mL   CBC with platelets and differential   Result Value Ref Range    WBC Count 10.2 4.0 - 11.0 10e3/uL    RBC Count 4.93 4.40 - 5.90 10e6/uL    Hemoglobin 14.8 13.3 - 17.7 g/dL    Hematocrit 42.9 40.0 - 53.0 %    MCV 87 78 - 100 fL    MCH 30.0 26.5 - 33.0 pg    MCHC 34.5 31.5 - 36.5 g/dL    RDW 12.7 10.0 - 15.0 %    Platelet Count 204 150 - 450 10e3/uL    % Neutrophils 62 %    % Lymphocytes 29 %    % Monocytes 6 %    % Eosinophils 2 %    % Basophils 1 %    % Immature Granulocytes 1 %    NRBCs per 100 WBC 0 <1 /100    Absolute Neutrophils 6.3 1.6 - 8.3 10e3/uL    Absolute Lymphocytes 3.0 0.8 - 5.3 10e3/uL    Absolute Monocytes 0.6 0.0 - 1.3 10e3/uL    Absolute Eosinophils 0.2 0.0 - 0.7 10e3/uL    Absolute Basophils 0.1 0.0 - 0.2 10e3/uL    Absolute Immature Granulocytes 0.1 <=0.4 10e3/uL    Absolute NRBCs 0.0 10e3/uL   Extra Blue Top Tube   Result Value Ref Range    Hold Specimen JIC    Result Value Ref Range    Troponin T, High Sensitivity 8 <=22 ng/L       RADIOLOGY:  XR Chest 2 Views   Final Result   IMPRESSION: Lungs are clear. No pleural effusion. Borderline cardiac enlargement and prominent pulmonary vascularity. Endplate osteophyte formation thoracic spine.             EKG:    Sinus rhythm, no specific ST acute ischemic changes, no concerning dysrhythmias or interval prolongation,  compared to ECG of June 16, 2023, no specific ST acute ischemic change appreciated  I have independently reviewed and interpreted the EKG(s) documented above.    PROCEDURES:   None      I, Veronika Oneal, am serving as a scribe to document services personally performed by Chaparro Rocha MD, based on my observation and the provider's statements to me. I, Chaparro Rocha MD attest that Veronika Oneal is acting in a scribe capacity, has observed my performance of the services and has documented them in accordance with my direction.    Chaparro Rocha M.D.  Emergency Medicine  [facility] EMERGENCY DEPARTMENT  [facility] [facility] [address] [phone]  Dept: [phone]     Chaparro Rocha MD  12/19/24 [id number]

## 2024-12-25 LAB
ATRIAL RATE - MUSE: 89 BPM
DIASTOLIC BLOOD PRESSURE - MUSE: NORMAL MMHG
INTERPRETATION ECG - MUSE: NORMAL
P AXIS - MUSE: 59 DEGREES
PR INTERVAL - MUSE: 164 MS
QRS DURATION - MUSE: 98 MS
QT - MUSE: 382 MS
QTC - MUSE: 464 MS
R AXIS - MUSE: 78 DEGREES
SYSTOLIC BLOOD PRESSURE - MUSE: NORMAL MMHG
T AXIS - MUSE: 7 DEGREES
VENTRICULAR RATE- MUSE: 89 BPM

## 2024-12-30 ENCOUNTER — TRANSFERRED RECORDS (OUTPATIENT)
Dept: MULTI SPECIALTY CLINIC | Facility: CLINIC | Age: 49
End: 2024-12-30
Payer: COMMERCIAL

## 2024-12-30 LAB — RETINOPATHY: NORMAL

## 2025-01-05 ENCOUNTER — HEALTH MAINTENANCE LETTER (OUTPATIENT)
Age: 50
End: 2025-01-05

## 2025-01-06 ENCOUNTER — OFFICE VISIT (OUTPATIENT)
Dept: FAMILY MEDICINE | Facility: CLINIC | Age: 50
End: 2025-01-06
Payer: COMMERCIAL

## 2025-01-06 ENCOUNTER — OFFICE VISIT (OUTPATIENT)
Dept: CARDIOLOGY | Facility: CLINIC | Age: 50
End: 2025-01-06
Attending: EMERGENCY MEDICINE
Payer: COMMERCIAL

## 2025-01-06 VITALS
SYSTOLIC BLOOD PRESSURE: 136 MMHG | WEIGHT: 303.5 LBS | DIASTOLIC BLOOD PRESSURE: 76 MMHG | HEART RATE: 78 BPM | OXYGEN SATURATION: 96 % | TEMPERATURE: 98.4 F | HEIGHT: 73 IN | BODY MASS INDEX: 40.22 KG/M2 | RESPIRATION RATE: 17 BRPM

## 2025-01-06 VITALS
DIASTOLIC BLOOD PRESSURE: 80 MMHG | HEART RATE: 84 BPM | BODY MASS INDEX: 40.11 KG/M2 | RESPIRATION RATE: 14 BRPM | SYSTOLIC BLOOD PRESSURE: 140 MMHG | WEIGHT: 304 LBS

## 2025-01-06 DIAGNOSIS — R00.2 PALPITATIONS: Primary | ICD-10-CM

## 2025-01-06 DIAGNOSIS — G47.33 OSA ON CPAP: ICD-10-CM

## 2025-01-06 DIAGNOSIS — E11.65 TYPE 2 DIABETES MELLITUS WITH HYPERGLYCEMIA, WITHOUT LONG-TERM CURRENT USE OF INSULIN (H): ICD-10-CM

## 2025-01-06 DIAGNOSIS — R00.2 PALPITATIONS: ICD-10-CM

## 2025-01-06 DIAGNOSIS — R06.09 DOE (DYSPNEA ON EXERTION): Primary | ICD-10-CM

## 2025-01-06 DIAGNOSIS — E66.01 MORBID OBESITY (H): ICD-10-CM

## 2025-01-06 DIAGNOSIS — I10 ESSENTIAL HYPERTENSION: ICD-10-CM

## 2025-01-06 DIAGNOSIS — F41.9 ANXIETY: ICD-10-CM

## 2025-01-06 DIAGNOSIS — E78.2 MIXED HYPERLIPIDEMIA: ICD-10-CM

## 2025-01-06 DIAGNOSIS — E78.1 HYPERTRIGLYCERIDEMIA: ICD-10-CM

## 2025-01-06 LAB
ALBUMIN SERPL BCG-MCNC: 4.6 G/DL (ref 3.5–5.2)
ALP SERPL-CCNC: 85 U/L (ref 40–150)
ALT SERPL W P-5'-P-CCNC: 51 U/L (ref 0–70)
ANION GAP SERPL CALCULATED.3IONS-SCNC: 14 MMOL/L (ref 7–15)
AST SERPL W P-5'-P-CCNC: 29 U/L (ref 0–45)
BILIRUB SERPL-MCNC: 0.4 MG/DL
BUN SERPL-MCNC: 18.9 MG/DL (ref 6–20)
CALCIUM SERPL-MCNC: 9.9 MG/DL (ref 8.8–10.4)
CHLORIDE SERPL-SCNC: 98 MMOL/L (ref 98–107)
CREAT SERPL-MCNC: 1.01 MG/DL (ref 0.67–1.17)
EGFRCR SERPLBLD CKD-EPI 2021: >90 ML/MIN/1.73M2
EST. AVERAGE GLUCOSE BLD GHB EST-MCNC: 151 MG/DL
GLUCOSE SERPL-MCNC: 149 MG/DL (ref 70–99)
HBA1C MFR BLD: 6.9 % (ref 0–5.6)
HCO3 SERPL-SCNC: 25 MMOL/L (ref 22–29)
MAGNESIUM SERPL-MCNC: 2 MG/DL (ref 1.7–2.3)
POTASSIUM SERPL-SCNC: 4.7 MMOL/L (ref 3.4–5.3)
PROT SERPL-MCNC: 8 G/DL (ref 6.4–8.3)
SODIUM SERPL-SCNC: 137 MMOL/L (ref 135–145)

## 2025-01-06 PROCEDURE — 80053 COMPREHEN METABOLIC PANEL: CPT | Performed by: STUDENT IN AN ORGANIZED HEALTH CARE EDUCATION/TRAINING PROGRAM

## 2025-01-06 PROCEDURE — 99215 OFFICE O/P EST HI 40 MIN: CPT | Performed by: STUDENT IN AN ORGANIZED HEALTH CARE EDUCATION/TRAINING PROGRAM

## 2025-01-06 PROCEDURE — 83036 HEMOGLOBIN GLYCOSYLATED A1C: CPT | Performed by: STUDENT IN AN ORGANIZED HEALTH CARE EDUCATION/TRAINING PROGRAM

## 2025-01-06 PROCEDURE — G2211 COMPLEX E/M VISIT ADD ON: HCPCS | Performed by: INTERNAL MEDICINE

## 2025-01-06 PROCEDURE — 99214 OFFICE O/P EST MOD 30 MIN: CPT | Performed by: INTERNAL MEDICINE

## 2025-01-06 PROCEDURE — 83735 ASSAY OF MAGNESIUM: CPT | Performed by: STUDENT IN AN ORGANIZED HEALTH CARE EDUCATION/TRAINING PROGRAM

## 2025-01-06 PROCEDURE — 36415 COLL VENOUS BLD VENIPUNCTURE: CPT | Performed by: STUDENT IN AN ORGANIZED HEALTH CARE EDUCATION/TRAINING PROGRAM

## 2025-01-06 PROCEDURE — G2211 COMPLEX E/M VISIT ADD ON: HCPCS | Performed by: STUDENT IN AN ORGANIZED HEALTH CARE EDUCATION/TRAINING PROGRAM

## 2025-01-06 RX ORDER — SIMVASTATIN 10 MG
10 TABLET ORAL AT BEDTIME
Qty: 90 TABLET | Refills: 0 | Status: SHIPPED | OUTPATIENT
Start: 2025-01-06

## 2025-01-06 ASSESSMENT — PAIN SCALES - GENERAL: PAINLEVEL_OUTOF10: NO PAIN (0)

## 2025-01-06 NOTE — PATIENT INSTRUCTIONS
Will get a stress test to exclude blocked arteries as cause of skipped heart beat.  No medication changes at present times.  Avoid caffeine.

## 2025-01-06 NOTE — PROGRESS NOTES
HEART CARE ENCOUNTER NOTE      [facility]  [phone]      Assessment/Recommendations   Assessment:   1.  Palpitations, probably benign PVCs or PACs is noted on previous monitor.  The reason for increase in frequency lately is uncertain.  Given his recent diagnosis of diabetes, obesity, and intermittent dyspnea with significant activity I believe would be prudent to rule out obstructive coronary disease triggering dysrhythmias.  His stress test 10 years ago suggested some fixed defects anterior and inferior which were probably attenuation artifact.  Wall motion was normal however cannot exclude ischemic response.      Plan:   1.  Will arrange exercise MPI stress test to rule out inducible ischemia or increase in dysrhythmia with stress  2.  No new medications at present time but would consider empiric beta-blocker therapy especially if he has hypertensive or ischemic response to stress.  Consider wearable rhythm monitor such as Apple Watch if symptoms persist.  3.  Continue to restrict caffeine intake, eliminate altogether if possible.    The longitudinal plan of care for the diagnosis(es)/condition(s) as documented were addressed during this visit. Due to the added complexity in care, I will continue to support Dion in the subsequent management and with ongoing continuity of care.         History of Present Illness/Subjective    HPI: Dion Tatum is a 49 year old male who returns for evaluation of symptoms of palpitations and intermittent exercise intolerance/dyspnea.  For the last 3 months he has noted an increase in sensation of skipped heartbeats followed by a brief fluttering sensation followed by a more forceful single beat.  These always occur in isolation.  He has not had any symptoms of sustained tachycardia.  Sometimes he goes several minutes without a palpitation sensation,  other times he will have several per minute.  When they occur they are very unsettling to him.  Denies  any chest pain, presyncope or syncope.  He was seen in the ED recently for the symptoms and noted that they correlated with premature ventricular beats noted on the cardiac monitor.  He was recently diagnosed with diabetes and is trying to exercise to help lose weight and reduce his hemoglobin A1c.     Studies reviewed:  EC2024: NSR, nonspecific ST-T changes.  No ectopy.  Personally reviewed  GXT: 3/26/2014:  Impression     I   Exercise Test        1.  The patient exercised to a adequate cardiac workload with                   Average functional aerobic capacity demonstrated.        2. The patient experienced no chest pain during the test.        3. EKG report done under separate cover.     II    Myocardial Perfusion Scintigraphy   1.  Myocardial perfusion imaging using single isotope technique   demonstrated a moderate fixed anterior anteroseptal defect and a small   basal inferoseptal defect. There is no reversibility on the study to   suggest ischemia.   2. Gated images demonstrated a normal LV cavity size and systolic   function.  The left ventricular systolic function is 62%.   3. Compared to the prior study from no previous study .     Holter: 8/10/2023: NSR, rare PAC and PVC, no other arrhythmias.           Physical Examination  Review of Systems   BP (!) 140/80 (BP Location: Right arm, Patient Position: Sitting, Cuff Size: Adult Large)   Pulse 84   Resp 14   Wt 137.9 kg (304 lb)   BMI 40.11 kg/m    Body mass index is 40.11 kg/m .  Wt Readings from Last 3 Encounters:   25 137.9 kg (304 lb)   25 137.7 kg (303 lb 8 oz)   24 139.1 kg (306 lb 9.6 oz)       General Appearance:   Pleasant gentleman appears stated age. no acute distress, heavyset body habitus   ENT/Mouth: Oropharynx normal    EYES:  no scleral icterus, normal conjunctivae. No eyelid xanthelasma   Neck: No cervical lymphadenopathy  Thyroid not enlarged or nodular  No JVD   Respiratory:   lungs clear , no rales or  wheezing, Normal chest wall expansion    Cardiovascular:   Regular rhythm, normal rate. Normal 1st and 2nd heart sounds.  No murmurs, no rubs or gallops;   radial pulses are full and equal   Jugular venous pressure is not elevated   Abdomen/GI:  No tenderness, no organomegaly, no pulsatile masses. NBS.  Obese   Extremities: No cyanosis or clubbing.  No peripheral edema   Skin: No rash or lesions   Heme/lymph/ Immunology No lymphadenopathy, petechiae   Neurologic: Alert and oriented. No focal motor weakness, gait appears normal.       Psychiatric: Pleasant, calm, appropriate affect.          No known hepatic, renal, pulmonary, or CNS disorders. The remainder of the complete ROS is negative except as noted above.         Medical History  Surgical History Family History Social History   Past Medical History:   Diagnosis Date    Chronic fatigue     Hypertension 2015    slightly higher than normal    Obesity     Type 2 diabetes mellitus with hyperglycemia, without long-term current use of insulin (H) 1/15/2023     Past Surgical History:   Procedure Laterality Date    NO HISTORY OF SURGERY      [facility]       Family History   Adopted: Yes   Problem Relation Age of Onset    Unknown/Adopted Mother     Unknown/Adopted Father         Social History     Socioeconomic History    Marital status:      Spouse name: Not on file    Number of children: Not on file    Years of education: Not on file    Highest education level: Not on file   Occupational History    Occupation: Corporate Accounting Mgr     Employer: [employer]   Tobacco Use    Smoking status: Never     Passive exposure: Never    Smokeless tobacco: Never   Vaping Use    Vaping status: Never Used   Substance and Sexual Activity    Alcohol use: Yes     Alcohol/week: 0.0 standard drinks of alcohol     Comment: occasional    Drug use: No    Sexual activity: Yes     Partners: Female     Birth control/protection: Pill   Other Topics Concern     Parent/sibling w/ CABG, MI or angioplasty before 65F 55M? No     Comment: NA - adopted     Service No    Blood Transfusions No    Caffeine Concern No    Occupational Exposure No    Hobby Hazards No    Sleep Concern Yes     Comment: hard time falling asleep    Stress Concern Yes    Weight Concern Yes     Comment: feels he needs to loose some weight    Special Diet No    Back Care No    Exercise Yes     Comment: occasionally    Bike Helmet No    Seat Belt Yes    Self-Exams Yes   Social History Narrative    Not on file     Social Drivers of Health     Financial Resource Strain: Low Risk  (3/19/2024)    Financial Resource Strain     Within the past 12 months, have you or your family members you live with been unable to get utilities (heat, electricity) when it was really needed?: No   Food Insecurity: Low Risk  (3/19/2024)    Food Insecurity     Within the past 12 months, did you worry that your food would run out before you got money to buy more?: No     Within the past 12 months, did the food you bought just not last and you didn t have money to get more?: No   Transportation Needs: Low Risk  (3/19/2024)    Transportation Needs     Within the past 12 months, has lack of transportation kept you from medical appointments, getting your medicines, non-medical meetings or appointments, work, or from getting things that you need?: No   Physical Activity: Unknown (3/19/2024)    Exercise Vital Sign     Days of Exercise per Week: 1 day     Minutes of Exercise per Session: Not on file   Stress: Stress Concern Present (3/19/2024)    [employer] [place] [employer] - Occupational Stress Questionnaire     Feeling of Stress : Rather much   Social Connections: Unknown (3/19/2024)    Social Connection and Isolation Panel [NHANES]     Frequency of Communication with Friends and Family: Not on file     Frequency of Social Gatherings with Friends and Family: Once a week     Attends Yazdanism Services: Not on file      Active Member of Clubs or Organizations: Not on file     Attends Club or Organization Meetings: Not on file     Marital Status: Not on file   Interpersonal Safety: Low Risk  (10/4/2024)    Interpersonal Safety     Do you feel physically and emotionally safe where you currently live?: Yes     Within the past 12 months, have you been hit, slapped, kicked or otherwise physically hurt by someone?: No     Within the past 12 months, have you been humiliated or emotionally abused in other ways by your partner or ex-partner?: No   Housing Stability: Low Risk  (3/19/2024)    Housing Stability     Do you have housing? : Yes     Are you worried about losing your housing?: No           Medications  Allergies   Current Outpatient Medications   Medication Sig Dispense Refill    blood glucose (NO BRAND SPECIFIED) lancets standard Use to test blood sugar 1 times daily or as directed. 100 each 3    blood glucose (NO BRAND SPECIFIED) test strip Use to test blood sugar 2 times daily or as directed. To accompany: Blood Glucose Monitor Brands: per insurance. 100 strip 11    blood glucose (NO BRAND SPECIFIED) test strip Use to test blood sugar 1 times daily or as directed. 100 strip 3    EPINEPHrine (EPIPEN/ADRENACLICK/OR ANY BX GENERIC EQUIV) 0.3 MG/0.3ML injection 2-pack Inject 0.3 mLs (0.3 mg) into the muscle as needed for anaphylaxis 0.6 mL 1    fish oil-omega-3 fatty acids 1000 MG capsule Take 2 g by mouth daily.      losartan (COZAAR) 25 MG tablet Take 1 tablet (25 mg) by mouth daily. 90 tablet 1    metFORMIN (GLUCOPHAGE XR) 500 MG 24 hr tablet Take 1 tablet (500 mg) by mouth 2 times daily (with meals). 2 tabs daily 180 tablet 0    Multiple vitamin TABS Take by mouth daily      simvastatin (ZOCOR) 10 MG tablet Take 1 tablet (10 mg) by mouth at bedtime. 90 tablet 0    thin (NO BRAND SPECIFIED) lancets Use to test blood sugar 2 times daily or as directed. To accompany: Blood Glucose Monitor Brands: per insurance. 100 each 11      "  Allergies   Allergen Reactions    Penicillins           Lab Results    Chemistry/lipid CBC Cardiac Enzymes/BNP/TSH/INR   Recent Labs   Lab Test 10/04/24  1022   CHOL 185   HDL 28*   *   TRIG 404*     Recent Labs   Lab Test 10/04/24  1022 03/19/24  0724 12/22/22  0833   * 101* 81     Recent Labs   Lab Test 12/19/24  1540      POTASSIUM 4.5   CHLORIDE 100   CO2 24   *   BUN 19.1   CR 0.98   GFRESTIMATED >90   MANDEEP 9.3     Recent Labs   Lab Test 12/19/24  1540 10/04/24  1022 03/19/24  0724   CR 0.98 0.97 0.99     Recent Labs   Lab Test 01/06/25  1005 10/04/24  1022 03/19/24  0724   A1C 6.9* 7.5* 7.1*          Recent Labs   Lab Test 12/19/24  1540   WBC 10.2   HGB 14.8   HCT 42.9   MCV 87        Recent Labs   Lab Test 12/19/24  1540 10/04/24  1022 06/16/23  0116   HGB 14.8 15.2 14.5    No results for input(s): \"TROPONINI\" in the last 17660 hours.  No results for input(s): \"BNP\", \"NTBNPI\", \"NTBNP\" in the last 94598 hours.  Recent Labs   Lab Test 12/19/24  1540   TSH 1.41     No results for input(s): \"INR\" in the last 90307 hours.     Obi Arora MD                                    "

## 2025-01-06 NOTE — LETTER
1/6/2025    Laurita Reganbalajiewelina, DO  [address] [facility] [address]    RE: Dion GAMBLE Deepti       Dear Colleague,     [facility] the [facility].    HEART CARE ENCOUNTER NOTE      [facility]  [phone]      Assessment/Recommendations   Assessment:   1.  Palpitations, probably benign PVCs or PACs is noted on previous monitor.  The reason for increase in frequency lately is uncertain.  Given his recent diagnosis of diabetes, obesity, and intermittent dyspnea with significant activity I believe would be prudent to rule out obstructive coronary disease triggering dysrhythmias.  His stress test 10 years ago suggested some fixed defects anterior and inferior which were probably attenuation artifact.  Wall motion was normal however cannot exclude ischemic response.      Plan:   1.  Will arrange exercise MPI stress test to rule out inducible ischemia or increase in dysrhythmia with stress  2.  No new medications at present time but would consider empiric beta-blocker therapy especially if he has hypertensive or ischemic response to stress.  Consider wearable rhythm monitor such as Apple Watch if symptoms persist.  3.  Continue to restrict caffeine intake, eliminate altogether if possible.    The longitudinal plan of care for the diagnosis(es)/condition(s) as documented were addressed during this visit. Due to the added complexity in care, I will continue to support Dion in the subsequent management and with ongoing continuity of care.         History of Present Illness/Subjective    HPI: Dion Tatum is a 49 year old male who returns for evaluation of symptoms of palpitations and intermittent exercise intolerance/dyspnea.  For the last 3 months he has noted an increase in sensation of skipped heartbeats followed by a brief fluttering sensation followed by a more forceful single beat.  These always occur in isolation.  He has not had any  symptoms of sustained tachycardia.  Sometimes he goes several minutes without a palpitation sensation,  other times he will have several per minute.  When they occur they are very unsettling to him.  Denies any chest pain, presyncope or syncope.  He was seen in the ED recently for the symptoms and noted that they correlated with premature ventricular beats noted on the cardiac monitor.  He was recently diagnosed with diabetes and is trying to exercise to help lose weight and reduce his hemoglobin A1c.     Studies reviewed:  EC2024: NSR, nonspecific ST-T changes.  No ectopy.  Personally reviewed  GXT: 3/26/2014:  Impression     I   Exercise Test        1.  The patient exercised to a adequate cardiac workload with                   Average functional aerobic capacity demonstrated.        2. The patient experienced no chest pain during the test.        3. EKG report done under separate cover.     II    Myocardial Perfusion Scintigraphy   1.  Myocardial perfusion imaging using single isotope technique   demonstrated a moderate fixed anterior anteroseptal defect and a small   basal inferoseptal defect. There is no reversibility on the study to   suggest ischemia.   2. Gated images demonstrated a normal LV cavity size and systolic   function.  The left ventricular systolic function is 62%.   3. Compared to the prior study from no previous study .     Holter: 8/10/2023: NSR, rare PAC and PVC, no other arrhythmias.           Physical Examination  Review of Systems   BP (!) 140/80 (BP Location: Right arm, Patient Position: Sitting, Cuff Size: Adult Large)   Pulse 84   Resp 14   Wt 137.9 kg (304 lb)   BMI 40.11 kg/m    Body mass index is 40.11 kg/m .  Wt Readings from Last 3 Encounters:   25 137.9 kg (304 lb)   25 137.7 kg (303 lb 8 oz)   24 139.1 kg (306 lb 9.6 oz)       General Appearance:   Pleasant gentleman appears stated age. no acute distress, heavyset body habitus   ENT/Mouth: Oropharynx  normal    EYES:  no scleral icterus, normal conjunctivae. No eyelid xanthelasma   Neck: No cervical lymphadenopathy  Thyroid not enlarged or nodular  No JVD   Respiratory:   lungs clear , no rales or wheezing, Normal chest wall expansion    Cardiovascular:   Regular rhythm, normal rate. Normal 1st and 2nd heart sounds.  No murmurs, no rubs or gallops;   radial pulses are full and equal   Jugular venous pressure is not elevated   Abdomen/GI:  No tenderness, no organomegaly, no pulsatile masses. NBS.  Obese   Extremities: No cyanosis or clubbing.  No peripheral edema   Skin: No rash or lesions   Heme/lymph/ Immunology No lymphadenopathy, petechiae   Neurologic: Alert and oriented. No focal motor weakness, gait appears normal.       Psychiatric: Pleasant, calm, appropriate affect.          No known hepatic, renal, pulmonary, or CNS disorders. The remainder of the complete ROS is negative except as noted above.         Medical History  Surgical History Family History Social History   Past Medical History:   Diagnosis Date     Chronic fatigue      Hypertension 2015    slightly higher than normal     Obesity      Type 2 diabetes mellitus with hyperglycemia, without long-term current use of insulin (H) 1/15/2023     Past Surgical History:   Procedure Laterality Date     NO HISTORY OF SURGERY       [facility]       Family History   Adopted: Yes   Problem Relation Age of Onset     Unknown/Adopted Mother      Unknown/Adopted Father         Social History     Socioeconomic History     Marital status:      Spouse name: Not on file     Number of children: Not on file     Years of education: Not on file     Highest education level: Not on file   Occupational History     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 No     Comment: NA - adopted      Service No     Blood Transfusions No     Caffeine Concern No     Occupational Exposure No     Hobby Hazards No     Sleep Concern Yes     Comment: hard time falling asleep     Stress Concern Yes     Weight Concern Yes     Comment: feels he needs to loose some weight     Special Diet No     Back Care No     Exercise Yes     Comment: occasionally     Bike Helmet No     Seat Belt Yes     Self-Exams Yes   Social History Narrative     Not on file     Social Drivers of Health     Financial Resource Strain: Low Risk  (3/19/2024)    Financial Resource Strain      Within the past 12 months, have you or your family members you live with been unable to get utilities (heat, electricity) when it was really needed?: No   Food Insecurity: Low Risk  (3/19/2024)    Food Insecurity      Within the past 12 months, did you worry that your food would run out before you got money to buy more?: No      Within the past 12 months, did the food you bought just not last and you didn t have money to get more?: No   Transportation Needs: Low Risk  (3/19/2024)    Transportation Needs      Within the past 12 months, has lack of transportation kept you from medical appointments, getting your medicines, non-medical meetings or appointments, work, or from getting things that you need?: No   Physical Activity: Unknown (3/19/2024)    Exercise Vital Sign      Days of Exercise per Week: 1 day      Minutes of Exercise per Session: Not on file   Stress: Stress Concern Present (3/19/2024)    [place] [employer] - Occupational Stress Questionnaire      Feeling of Stress : Rather much   Social Connections: Unknown (3/19/2024)    Social  Connection and Isolation Panel [NHANES]      Frequency of Communication with Friends and Family: Not on file      Frequency of Social Gatherings with Friends and Family: Once a week      Attends Shinto Services: Not on file      Active Member of Clubs or Organizations: Not on file      Attends Club or Organization Meetings: Not on file      Marital Status: Not on file   Interpersonal Safety: Low Risk  (10/4/2024)    Interpersonal Safety      Do you feel physically and emotionally safe where you currently live?: Yes      Within the past 12 months, have you been hit, slapped, kicked or otherwise physically hurt by someone?: No      Within the past 12 months, have you been humiliated or emotionally abused in other ways by your partner or ex-partner?: No   Housing Stability: Low Risk  (3/19/2024)    Housing Stability      Do you have housing? : Yes      Are you worried about losing your housing?: No           Medications  Allergies   Current Outpatient Medications   Medication Sig Dispense Refill     blood glucose (NO BRAND SPECIFIED) lancets standard Use to test blood sugar 1 times daily or as directed. 100 each 3     blood glucose (NO BRAND SPECIFIED) test strip Use to test blood sugar 2 times daily or as directed. To accompany: Blood Glucose Monitor Brands: per insurance. 100 strip 11     blood glucose (NO BRAND SPECIFIED) test strip Use to test blood sugar 1 times daily or as directed. 100 strip 3     EPINEPHrine (EPIPEN/ADRENACLICK/OR ANY BX GENERIC EQUIV) 0.3 MG/0.3ML injection 2-pack Inject 0.3 mLs (0.3 mg) into the muscle as needed for anaphylaxis 0.6 mL 1     fish oil-omega-3 fatty acids 1000 MG capsule Take 2 g by mouth daily.       losartan (COZAAR) 25 MG tablet Take 1 tablet (25 mg) by mouth daily. 90 tablet 1     metFORMIN (GLUCOPHAGE XR) 500 MG 24 hr tablet Take 1 tablet (500 mg) by mouth 2 times daily (with meals). 2 tabs daily 180 tablet 0     Multiple vitamin TABS Take by mouth daily        "simvastatin (ZOCOR) 10 MG tablet Take 1 tablet (10 mg) by mouth at bedtime. 90 tablet 0     thin (NO BRAND SPECIFIED) lancets Use to test blood sugar 2 times daily or as directed. To accompany: Blood Glucose Monitor Brands: per insurance. 100 each 11       Allergies   Allergen Reactions     Penicillins           Lab Results    Chemistry/lipid CBC Cardiac Enzymes/BNP/TSH/INR   Recent Labs   Lab Test 10/04/24  1022   CHOL 185   HDL 28*   *   TRIG 404*     Recent Labs   Lab Test 10/04/24  1022 03/19/24  0724 12/22/22  0833   * 101* 81     Recent Labs   Lab Test 12/19/24  1540      POTASSIUM 4.5   CHLORIDE 100   CO2 24   *   BUN 19.1   CR 0.98   GFRESTIMATED >90   MANDEEP 9.3     Recent Labs   Lab Test 12/19/24  1540 10/04/24  1022 03/19/24  0724   CR 0.98 0.97 0.99     Recent Labs   Lab Test 01/06/25  1005 10/04/24  1022 03/19/24  0724   A1C 6.9* 7.5* 7.1*          Recent Labs   Lab Test 12/19/24  1540   WBC 10.2   HGB 14.8   HCT 42.9   MCV 87        Recent Labs   Lab Test 12/19/24  1540 10/04/24  1022 06/16/23  0116   HGB 14.8 15.2 14.5    No results for input(s): \"TROPONINI\" in the last 23889 hours.  No results for input(s): \"BNP\", \"NTBNPI\", \"NTBNP\" in the last 54769 hours.  Recent Labs   Lab Test 12/19/24  1540   TSH 1.41     No results for input(s): \"INR\" in the last 59728 hours.     Obi Arora MD                                        Thank you for allowing me to participate in the care of your patient.      Sincerely,     Obi Arora MD     [facility]  cc:   Chaparro Rocha MD  [address] [place] [address]      "

## 2025-01-06 NOTE — PROGRESS NOTES
"  Assessment & Plan      Diagnosis Comments   1. Palpitations  Comprehensive metabolic panel (BMP + Alb, Alk Phos, ALT, AST, Total. Bili, TP), Magnesium       2. Type 2 diabetes mellitus with hyperglycemia, without long-term current use of insulin (H)  HEMOGLOBIN A1C       3. Mixed hyperlipidemia  simvastatin (ZOCOR) 10 MG tablet       4. JEFF on CPAP        5. Morbid obesity (H)        6. Hypertriglyceridemia        7. Essential hypertension        8. Anxiety            Palpitations:  Started to experience palpitations about 2 months ago when he got sick and started taking losartan + statin. Was unsure if the palpitations were due to meds or illness. His illness has resolved but palpitations are persistent.  He does not experience the palpitations often but when he does, it stops him in his tracks.  Not consistently associated with exertion.  It has caused significant anxiety.  He worries about not being able to \"exercise like he wants to\" without causing \"damage\". No chest pain, diaphoresis, nausea or vomiting associated with this.  Has not progressed.  Went to the ED on 12/19 and workup was unremarkable.  EKG showed nonspecific ST changes.  Stress test 2014 unremarkable.   Plan:   --Has upcoming appointment with cardiology later this afternoon.  He would like to hold off on any workup until he speaks to them.  - He started taking Mg supplement.  Will check Mg levels today.  - Did discuss with him that if cardiac workup is negative, we should consider that anxiety is playing a role.  He verbalized understanding.  Will follow-up in 6 weeks    Type 2 diabetes:  Last A1c 10/4/2024 was 7.5  Patient is currently on metformin 500 mg twice daily  Is not checking his sugars  He notes that he was doing really well with lifestyle changes x 1 month.  Lost about 20 pounds but then got sick and stopped exercising as consistently.  Gained weight back.  Patient feels like he has been \"spinning his wheels\" regarding his diabetes.  " "He stresses about his health and worries about long-term outcomes.  He feels like there is \"too many opinions\" on the Internet and he is not sure how to make sustainable changes  He feels disheartened by his weight gain  Plan:  - Supportive listening provided  - Reviewed importance of consistent, small changes and gradual increase of exercise to allow for long-term improvement and maintenance of A1c  - Our goal for his A1c should be between 6-7 (preferably closer to 6)  - Will recheck A1c today and titrate medications  - She was started on atorvastatin last time but he felt a lot of brain fog on the atorvastatin discontinued it.  Will trial him on low-dose of simvastatin.    Essential hypertension:  Last time he was here, we had started low-dose losartan.  He has been taking that for about 7 months and tolerating it well.  No significant side effects.  Blood pressure today is 150/86 --> 140/80 on repeat  Feels that his elevated blood pressures have a lot to do with his mental health and anxiety levels.  When he stressed, blood pressures are high but at home, when he is resting, systolics are generally 120s.  Today, he is very stressed about the upcoming cardiology appointment  Plan:  - Get updated BMP  - Reported numbers at home are discordant with the blood pressure numbers today.  Discussed increasing blood pressure medications vs working on lifestyle changes to help with weight loss  - Ultimately, we decided to hold on any changes for blood pressure medications.  He will work on lifestyle and we will see each other back in 6 weeks to assess    Hyperlipidemia:  The 10-year ASCVD risk score (Shayna LUNA, et al., 2019) is: 16.2%  Started on atorvastatin 40 mg last time.  Unfortunately, he discontinued the atorvastatin because it caused brain fog.  Has been off of it for about a month.  Is taking fish oil consistently.  Is open to trialing alternate statin.  Plan:  - Will start cautiously with simvastatin 10 mg and " "uptitrate at future visits    JEFF on CPAP:  Consistently using CPAP without any difficulty    The longitudinal plan of care for the diagnosis(es)/condition(s) as documented were addressed during this visit. Due to the added complexity in care, I will continue to support Dion in the subsequent management and with ongoing continuity of care.        45 minutes spent by me on the date of the encounter doing chart review, history and exam, documentation and further activities per the note    Subjective   Dion is a 49 year old, presenting for the following health issues:  Follow Up and Diabetes        1/6/2025     9:04 AM   Additional Questions   Roomed by Dion BENITEZ CMA     Via the Health Maintenance questionnaire, the patient has reported the following services have been completed -Eye Exam: [facility] 2024-12-30, this information has been sent to the abstraction team.        Review of Systems  As per HPI      Objective    BP (!) 150/86 (BP Location: Right arm, Patient Position: Sitting, Cuff Size: Adult Large)   Pulse 78   Temp 98.4  F (36.9  C) (Oral)   Resp 17   Ht 1.854 m (6' 1\")   Wt 137.7 kg (303 lb 8 oz)   SpO2 96%   BMI 40.04 kg/m    Body mass index is 40.04 kg/m .  Physical Exam   GENERAL: alert and no distress  NECK: no adenopathy, no asymmetry, masses, or scars  RESP: lungs clear to auscultation - no rales, rhonchi or wheezes  CV: regular rate and rhythm, no murmur, click or rub, no peripheral edema  MS: no gross musculoskeletal defects noted, no edema  PSYCH: mentation appears normal, affect anxious, somewhat circular reasoning, some insight        Signed Electronically by: Laurita Robin,     "

## 2025-01-10 NOTE — PROGRESS NOTES
Outcome for 01/10/25 3:54 PM: [email or web address] message sent  Senia Varma MA  Outcome for 01/20/25 1:57 PM: Per patient, will send BG readings via [email or web address]  Bobbilynn Grossaint, VF  Outcome for 01/20/25 2:46 PM: Glucose readings sent via [employer]  Senia Varma MA    Patient is showing 3/5 MNCM met. BP out range and Aspirin not prescribed   Bobbi Grossaint, VF

## 2025-01-20 ENCOUNTER — TELEPHONE (OUTPATIENT)
Dept: ENDOCRINOLOGY | Facility: CLINIC | Age: 50
End: 2025-01-20

## 2025-01-20 ENCOUNTER — HOSPITAL ENCOUNTER (OUTPATIENT)
Dept: CARDIOLOGY | Facility: HOSPITAL | Age: 50
Discharge: HOME | End: 2025-01-20
Attending: INTERNAL MEDICINE
Payer: COMMERCIAL

## 2025-01-20 ENCOUNTER — HOSPITAL ENCOUNTER (OUTPATIENT)
Dept: NUCLEAR MEDICINE | Facility: HOSPITAL | Age: 50
Discharge: HOME | End: 2025-01-20
Attending: INTERNAL MEDICINE
Payer: COMMERCIAL

## 2025-01-20 DIAGNOSIS — R06.09 DOE (DYSPNEA ON EXERTION): ICD-10-CM

## 2025-01-20 PROCEDURE — 93018 CV STRESS TEST I&R ONLY: CPT | Performed by: INTERNAL MEDICINE

## 2025-01-20 PROCEDURE — 93017 CV STRESS TEST TRACING ONLY: CPT

## 2025-01-20 PROCEDURE — 343N000001 HC RX 343 MED OP 636: Performed by: INTERNAL MEDICINE

## 2025-01-20 PROCEDURE — 78452 HT MUSCLE IMAGE SPECT MULT: CPT | Mod: 26 | Performed by: INTERNAL MEDICINE

## 2025-01-20 PROCEDURE — A9500 TC99M SESTAMIBI: HCPCS | Performed by: INTERNAL MEDICINE

## 2025-01-20 PROCEDURE — 78452 HT MUSCLE IMAGE SPECT MULT: CPT

## 2025-01-20 PROCEDURE — 93016 CV STRESS TEST SUPVJ ONLY: CPT | Performed by: INTERNAL MEDICINE

## 2025-01-20 RX ADMIN — Medication 46.9 MILLICURIE: at 08:49

## 2025-01-20 NOTE — TELEPHONE ENCOUNTER
Per patient will send meter readings via [employer]. Bobbilynn Grossaint on 1/20/2025 at 1:57 PM

## 2025-01-20 NOTE — PROGRESS NOTES
Starting exercise program in Oct.  Feeling the palpitations.  Feeling the delay in beat causes increase exercise.  Looking for cardiac clearance and evaluation.  Arline Shields RN

## 2025-01-20 NOTE — PATIENT INSTRUCTIONS
New Diabetes Patient Info  Welcome to the Diabetes Optimization Program at the Endocrinology and Diabetes Clinic at [facility]!    Our Diabetes Optimization Program is here to provide you with a team-based, collaborative approach to optimize your diabetes management. The team is made up of Endocrinologists and Physician Assistants here at the Clinic, your Primary Care Physician, Certified Diabetes Educators, Registered Nurses, Medical Assistants, Emergency Medical Technicians and Visit Facilitators.     During your care with us, we promise to:   Work with you and your Primary Care Provider Laurita Robin DOPCP - General  to optimize your diabetes management.   Provide you with the tools and support you need to achieve a healthier lifestyle  Help you to control your diabetes by offering new treatments, education, and support to improve your diabetes management  Help you graduate back to your primary care provider for ongoing care once your diabetes is under control      Endocrinology Clinics Phone Number: [phone]    [place] Address:   [place] Address:     [address] [place] [id number] [facility] [place] [address]

## 2025-01-21 ENCOUNTER — MYC MEDICAL ADVICE (OUTPATIENT)
Dept: CARDIOLOGY | Facility: CLINIC | Age: 50
End: 2025-01-21
Payer: COMMERCIAL

## 2025-01-21 ENCOUNTER — HOSPITAL ENCOUNTER (OUTPATIENT)
Dept: NUCLEAR MEDICINE | Facility: HOSPITAL | Age: 50
Discharge: HOME | End: 2025-01-21
Attending: INTERNAL MEDICINE
Payer: COMMERCIAL

## 2025-01-21 ENCOUNTER — TELEPHONE (OUTPATIENT)
Dept: CARDIOLOGY | Facility: CLINIC | Age: 50
End: 2025-01-21
Payer: COMMERCIAL

## 2025-01-21 LAB
CV STRESS CURRENT BP HE: NORMAL
CV STRESS CURRENT HR HE: 102
CV STRESS CURRENT HR HE: 104
CV STRESS CURRENT HR HE: 106
CV STRESS CURRENT HR HE: 107
CV STRESS CURRENT HR HE: 108
CV STRESS CURRENT HR HE: 110
CV STRESS CURRENT HR HE: 110
CV STRESS CURRENT HR HE: 111
CV STRESS CURRENT HR HE: 113
CV STRESS CURRENT HR HE: 115
CV STRESS CURRENT HR HE: 117
CV STRESS CURRENT HR HE: 117
CV STRESS CURRENT HR HE: 122
CV STRESS CURRENT HR HE: 126
CV STRESS CURRENT HR HE: 134
CV STRESS CURRENT HR HE: 134
CV STRESS CURRENT HR HE: 136
CV STRESS CURRENT HR HE: 141
CV STRESS CURRENT HR HE: 143
CV STRESS CURRENT HR HE: 143
CV STRESS CURRENT HR HE: 144
CV STRESS CURRENT HR HE: 144
CV STRESS CURRENT HR HE: 145
CV STRESS CURRENT HR HE: 150
CV STRESS CURRENT HR HE: 150
CV STRESS CURRENT HR HE: 152
CV STRESS CURRENT HR HE: 155
CV STRESS CURRENT HR HE: 89
CV STRESS CURRENT HR HE: 94
CV STRESS DEVIATION TIME HE: NORMAL
CV STRESS ECHO PERCENT HR HE: NORMAL
CV STRESS EXERCISE STAGE HE: NORMAL
CV STRESS FINAL RESTING BP HE: NORMAL
CV STRESS FINAL RESTING HR HE: 106
CV STRESS MAX HR HE: 174
CV STRESS MAX TREADMILL GRADE HE: 14
CV STRESS MAX TREADMILL SPEED HE: 3.4
CV STRESS PEAK DIA BP HE: NORMAL
CV STRESS PEAK SYS BP HE: NORMAL
CV STRESS PHASE HE: NORMAL
CV STRESS PROTOCOL HE: NORMAL
CV STRESS RESTING PT POSITION HE: NORMAL
CV STRESS RESTING PT POSITION HE: NORMAL
CV STRESS ST DEVIATION AMOUNT HE: NORMAL
CV STRESS ST DEVIATION ELEVATION HE: NORMAL
CV STRESS ST EVELATION AMOUNT HE: NORMAL
CV STRESS TEST TYPE HE: NORMAL
CV STRESS TOTAL STAGE TIME MIN 1 HE: NORMAL
RATE PRESSURE PRODUCT: NORMAL
STRESS ECHO BASELINE HR: 88
STRESS ECHO CALCULATED PERCENT HR: 102 %
STRESS ECHO LAST STRESS DIASTOLIC BP: 90
STRESS ECHO LAST STRESS HR: 155
STRESS ECHO LAST STRESS SYSTOLIC BP: 178
STRESS ECHO POST ESTIMATED WORKLOAD: 10.3
STRESS ECHO POST EXERCISE DUR MIN: 9
STRESS ECHO POST EXERCISE DUR SEC: 0
STRESS ECHO TARGET HR: 170
STRESS ST DEPRESSION: 1.5 MM

## 2025-01-21 PROCEDURE — 343N000001 HC RX 343 MED OP 636: Performed by: INTERNAL MEDICINE

## 2025-01-21 PROCEDURE — A9500 TC99M SESTAMIBI: HCPCS | Performed by: INTERNAL MEDICINE

## 2025-01-21 RX ADMIN — Medication 45.7 MILLICURIE: at 07:02

## 2025-01-21 NOTE — TELEPHONE ENCOUNTER
[facility] Call Center    Phone Message    May a detailed message be left on voicemail: yes    Reason for Call: Patient called requesting to speak with Dr. Arora in regards to his recent test results. Please call back to further discuss.    Thank you!  [facility]

## 2025-01-22 ENCOUNTER — VIRTUAL VISIT (OUTPATIENT)
Dept: ENDOCRINOLOGY | Facility: CLINIC | Age: 50
End: 2025-01-22
Payer: COMMERCIAL

## 2025-01-22 DIAGNOSIS — E11.65 TYPE 2 DIABETES MELLITUS WITH HYPERGLYCEMIA, WITHOUT LONG-TERM CURRENT USE OF INSULIN (H): Primary | ICD-10-CM

## 2025-01-22 NOTE — LETTER
1/22/2025      Dion Tatum  [address] [facility] [address]      Dear Colleague,    Thank you for referring your patient, Dion Tatum, to the [facility]. Please see a copy of my visit note below.    Outcome for 01/10/25 3:54 PM: [facility] message sent  Senia Varma MA  Outcome for 01/20/25 1:57 PM: Per patient, will send BG readings via [facility]  Bobbilynn Grossaint, VF  Outcome for 01/20/25 2:46 PM: Glucose readings sent via [facility]  Senia Varma MA    Patient is showing 3/5 MNCM met. BP out range and Aspirin not prescribed   Bobbi Grossaint, VF        Assessment/Plan :   Type 2 DM. Dion had a lot of questions regarding diet and the typical progression of diabetes. He also doesn't know exactly what he should be looking for in regards to blood sugars or a hemoglobin A1C. We discussed the importance of keeping his hemoglobin A1C under 7%. We did discuss how different medications help in the management of blood sugars. We also discussed how diet and exercise can lead to an improvement in insulin resistance. I do not see any reason to add a medication, at this time, but if his hemoglobin A1C starts to creep back up over 7%, he should consider adding a medication. I would like him to place a CGMS device. He is really focused on making dietary improvements and the sensor will give him real time data in regards to how food, and exercise, effect blood sugars. He has a follow-up with his primary care provider in February and he will discuss sensor therapy at that visit. We will follow-up as needed.     Due to the COVID 19 pandemic this visit was a telephone/video visit in order to help prevent spread of infection in this patient and the general population. The patient gave verbal consent for the visit today. I have independently reviewed and interpreted labs, imaging as indicated.       Distant Location (provider location):  Off-site  Mode of Communication:  Video  Conference via [facility]  Chart review/prep time 10    Joined the call at 1/22/2025, 2:00:06 pm.  Left the call at 1/22/2025, 2:30:58 pm.  You were on the call for 30 minutes 51 seconds .  44 minutes spent on the date of the encounter doing chart review, history and exam, documentation and further activities as noted above.      Chief complaint:  Dion is a 49 year old male who comes to our office for help in the management of Type 2 DM.    I have reviewed Care Everywhere including [facility], [facility], [facility], [facility], [place], [facility], [facility] , [facility], [place] lab reports, imaging reports and provider notes as indicated.      HISTORY OF PRESENT ILLNESS  Dion was diagnosed with diabetes in December of 2022. He states that pre-COVID he was great about having his fasting glucose checked yearly and it was never over 100 mg/dl. He admits that once his job switched to working from home, his diet and exercise also changed. He was snacking more throughout the day and he would spend more time in front of the computer. This all led to some weight gain and he figured that his blood sugar would be up but he was shocked when he was found to have a fasting glucose of 199 mg/dl. Follow-up testing found his hemoglobin A1C to be 8.9%. He was started on metformin and told to work on diet and exercise.    He doesn't feel like he had a lot of direction in regards to his diet. He knew that carbs lead to spikes in glucose, so he switched to a low carb diet. He was not really paying attention to the amount of saturated fat, though. At a follow-up visit his hemoglobin A1C had improved but his triglycerides were very elevated. There was a discussion regarding either increasing the metformin to 2000 mg daily, he currently takes 1000 mg daily, or adding another medication like Jardiance. He would prefer to continue to work on diet and exercise.     Dion did recently develop some issues with heart palpitations. He  met with a cardiologist and he received an all clear to exercise, earlier today. However, he has not been working out, as much, since he first experienced the heart palpitations. He has not had any problems with blurred vision. He also has no history of severe hyperglycemia and/or hypoglycemia. He has been monitoring his blood sugars closely with fingerstick testing 2-3x daily.    Endocrine relevant labs are as follows:   Latest Reference Range & Units 01/06/25 10:05   Hemoglobin A1C 0.0 - 5.6 % 6.9 (H)   (H): Data is abnormally high   Latest Reference Range & Units 10/04/24 10:22   Hemoglobin A1C 0.0 - 5.6 % 7.5 (H)   (H): Data is abnormally high   Latest Reference Range & Units 10/04/24 10:27   Albumin Urine mg/L mg/L <12.0     REVIEW OF SYSTEMS    Endocrine: positive for diabetes and obesity  Skin: negative  Eyes: negative for, visual blurring  Ears/Nose/Throat: negative  Respiratory: No shortness of breath, dyspnea on exertion, cough, or hemoptysis  Cardiovascular: positive for palpitations and irregular heart beat, negative for, chest pain, and exercise intolerance  Gastrointestinal: positive for dyspepsia, abdominal pain, and diarrhea when he tries to increase the metformin from 2 tablets, negative for, and constipation  Genitourinary: negative for, nocturia, dysuria, frequency, and urgency  Musculoskeletal: negative for and muscular weakness  Neurologic: negative for, numbness or tingling of hands, and numbness or tingling of feet  Psychiatric: negative  Hematologic/Lymphatic/Immunologic: negative    Past Medical History  Past Medical History:   Diagnosis Date     Chronic fatigue      Hypertension 2015    slightly higher than normal     Obesity      Type 2 diabetes mellitus with hyperglycemia, without long-term current use of insulin (H) 1/15/2023       Medications  Current Outpatient Medications   Medication Sig Dispense Refill     blood glucose (NO BRAND SPECIFIED) lancets standard Use to test blood sugar 1  times daily or as directed. 100 each 3     blood glucose (NO BRAND SPECIFIED) test strip Use to test blood sugar 2 times daily or as directed. To accompany: Blood Glucose Monitor Brands: per insurance. 100 strip 11     blood glucose (NO BRAND SPECIFIED) test strip Use to test blood sugar 1 times daily or as directed. 100 strip 3     EPINEPHrine (EPIPEN/ADRENACLICK/OR ANY BX GENERIC EQUIV) 0.3 MG/0.3ML injection 2-pack Inject 0.3 mLs (0.3 mg) into the muscle as needed for anaphylaxis 0.6 mL 1     fish oil-omega-3 fatty acids 1000 MG capsule Take 2 g by mouth daily.       losartan (COZAAR) 25 MG tablet Take 1 tablet (25 mg) by mouth daily. 90 tablet 1     metFORMIN (GLUCOPHAGE XR) 500 MG 24 hr tablet Take 1 tablet (500 mg) by mouth 2 times daily (with meals). 2 tabs daily 180 tablet 0     Multiple vitamin TABS Take by mouth daily       simvastatin (ZOCOR) 10 MG tablet Take 1 tablet (10 mg) by mouth at bedtime. 90 tablet 0     thin (NO BRAND SPECIFIED) lancets Use to test blood sugar 2 times daily or as directed. To accompany: Blood Glucose Monitor Brands: per insurance. 100 each 11       Allergies  Allergies   Allergen Reactions     Penicillins        Family History  family history includes Unknown/Adopted in his father and mother. He was adopted.    Social History  Social History     Tobacco Use     Smoking status: Never     Passive exposure: Never     Smokeless tobacco: Never   Vaping Use     Vaping status: Never Used   Substance Use Topics     Alcohol use: Yes     Alcohol/week: 0.0 standard drinks of alcohol     Comment: occasional     Drug use: No       Physical Exam  There is no height or weight on file to calculate BMI.  GENERAL: no distress  SKIN: Visible skin clear. No significant rash, abnormal pigmentation or lesions.  EYES: Eyes grossly normal to inspection.  No discharge or erythema, or obvious scleral/conjunctival abnormalities.  NECK: visible goiter is not present; no visible neck masses  RESP: No  audible wheeze, cough, or visible cyanosis.  No visible retractions or increased work of breathing.    NEURO: Awake, alert, responds appropriately to questions.  Mentation and speech fluent.  PSYCH:affect normal and appearance well-groomed.      DATA REVIEW  Please see attached glucose logs  Current Ave  mg/dl         Again, thank you for allowing me to participate in the care of your patient.        Sincerely,    Rebecca Feliciano PA-C    Electronically signed

## 2025-01-22 NOTE — NURSING NOTE
Current patient location: [address] [facility] [address]    Is the patient currently in the state of [place]? YES    Visit mode: VIDEO    If the visit is dropped, the patient can be reconnected by:VIDEO VISIT: Text to cell phone:   Telephone Information:   Mobile [phone]       Will anyone else be joining the visit? NO  (If patient encounters technical issues they should call [phone]    Are changes needed to the allergy or medication list? Pt stated no med changes    Are refills needed on medications prescribed by this physician? NO- new pt     Rooming Documentation:  Not applicable    Reason for visit: Consult (Diabetes per wife )    [place] DING

## 2025-01-22 NOTE — PROGRESS NOTES
Assessment/Plan :   Type 2 DM. Dion had a lot of questions regarding diet and the typical progression of diabetes. He also doesn't know exactly what he should be looking for in regards to blood sugars or a hemoglobin A1C. We discussed the importance of keeping his hemoglobin A1C under 7%. We did discuss how different medications help in the management of blood sugars. We also discussed how diet and exercise can lead to an improvement in insulin resistance. I do not see any reason to add a medication, at this time, but if his hemoglobin A1C starts to creep back up over 7%, he should consider adding a medication. I would like him to place a CGMS device. He is really focused on making dietary improvements and the sensor will give him real time data in regards to how food, and exercise, effect blood sugars. He has a follow-up with his primary care provider in February and he will discuss sensor therapy at that visit. We will follow-up as needed.     Due to the COVID 19 pandemic this visit was a telephone/video visit in order to help prevent spread of infection in this patient and the general population. The patient gave verbal consent for the visit today. I have independently reviewed and interpreted labs, imaging as indicated.       Distant Location (provider location):  Off-site  Mode of Communication:  Video Conference via [employer]  Chart review/prep time 10    Joined the call at 1/22/2025, 2:00:06 pm.  Left the call at 1/22/2025, 2:30:58 pm.  You were on the call for 30 minutes 51 seconds .  44 minutes spent on the date of the encounter doing chart review, history and exam, documentation and further activities as noted above.      Chief complaint:  Dion is a 49 year old male who comes to our office for help in the management of Type 2 DM.    I have reviewed Care Everywhere including [facility], [facility], [facility], [facility], [place], [facility], [facility] , [facility], [place] lab reports, imaging  reports and provider notes as indicated.      HISTORY OF PRESENT ILLNESS  Dion was diagnosed with diabetes in December of 2022. He states that pre-COVID he was great about having his fasting glucose checked yearly and it was never over 100 mg/dl. He admits that once his job switched to working from home, his diet and exercise also changed. He was snacking more throughout the day and he would spend more time in front of the computer. This all led to some weight gain and he figured that his blood sugar would be up but he was shocked when he was found to have a fasting glucose of 199 mg/dl. Follow-up testing found his hemoglobin A1C to be 8.9%. He was started on metformin and told to work on diet and exercise.    He doesn't feel like he had a lot of direction in regards to his diet. He knew that carbs lead to spikes in glucose, so he switched to a low carb diet. He was not really paying attention to the amount of saturated fat, though. At a follow-up visit his hemoglobin A1C had improved but his triglycerides were very elevated. There was a discussion regarding either increasing the metformin to 2000 mg daily, he currently takes 1000 mg daily, or adding another medication like Jardiance. He would prefer to continue to work on diet and exercise.     Dion did recently develop some issues with heart palpitations. He met with a cardiologist and he received an all clear to exercise, earlier today. However, he has not been working out, as much, since he first experienced the heart palpitations. He has not had any problems with blurred vision. He also has no history of severe hyperglycemia and/or hypoglycemia. He has been monitoring his blood sugars closely with fingerstick testing 2-3x daily.    Endocrine relevant labs are as follows:   Latest Reference Range & Units 01/06/25 10:05   Hemoglobin A1C 0.0 - 5.6 % 6.9 (H)   (H): Data is abnormally high   Latest Reference Range & Units 10/04/24 10:22   Hemoglobin A1C 0.0 - 5.6  % 7.5 (H)   (H): Data is abnormally high   Latest Reference Range & Units 10/04/24 10:27   Albumin Urine mg/L mg/L <12.0     REVIEW OF SYSTEMS    Endocrine: positive for diabetes and obesity  Skin: negative  Eyes: negative for, visual blurring  Ears/Nose/Throat: negative  Respiratory: No shortness of breath, dyspnea on exertion, cough, or hemoptysis  Cardiovascular: positive for palpitations and irregular heart beat, negative for, chest pain, and exercise intolerance  Gastrointestinal: positive for dyspepsia, abdominal pain, and diarrhea when he tries to increase the metformin from 2 tablets, negative for, and constipation  Genitourinary: negative for, nocturia, dysuria, frequency, and urgency  Musculoskeletal: negative for and muscular weakness  Neurologic: negative for, numbness or tingling of hands, and numbness or tingling of feet  Psychiatric: negative  Hematologic/Lymphatic/Immunologic: negative    Past Medical History  Past Medical History:   Diagnosis Date    Chronic fatigue     Hypertension 2015    slightly higher than normal    Obesity     Type 2 diabetes mellitus with hyperglycemia, without long-term current use of insulin (H) 1/15/2023       Medications  Current Outpatient Medications   Medication Sig Dispense Refill    blood glucose (NO BRAND SPECIFIED) lancets standard Use to test blood sugar 1 times daily or as directed. 100 each 3    blood glucose (NO BRAND SPECIFIED) test strip Use to test blood sugar 2 times daily or as directed. To accompany: Blood Glucose Monitor Brands: per insurance. 100 strip 11    blood glucose (NO BRAND SPECIFIED) test strip Use to test blood sugar 1 times daily or as directed. 100 strip 3    EPINEPHrine (EPIPEN/ADRENACLICK/OR ANY BX GENERIC EQUIV) 0.3 MG/0.3ML injection 2-pack Inject 0.3 mLs (0.3 mg) into the muscle as needed for anaphylaxis 0.6 mL 1    fish oil-omega-3 fatty acids 1000 MG capsule Take 2 g by mouth daily.      losartan (COZAAR) 25 MG tablet Take 1 tablet  (25 mg) by mouth daily. 90 tablet 1    metFORMIN (GLUCOPHAGE XR) 500 MG 24 hr tablet Take 1 tablet (500 mg) by mouth 2 times daily (with meals). 2 tabs daily 180 tablet 0    Multiple vitamin TABS Take by mouth daily      simvastatin (ZOCOR) 10 MG tablet Take 1 tablet (10 mg) by mouth at bedtime. 90 tablet 0    thin (NO BRAND SPECIFIED) lancets Use to test blood sugar 2 times daily or as directed. To accompany: Blood Glucose Monitor Brands: per insurance. 100 each 11       Allergies  Allergies   Allergen Reactions    Penicillins        Family History  family history includes Unknown/Adopted in his father and mother. He was adopted.    Social History  Social History     Tobacco Use    Smoking status: Never     Passive exposure: Never    Smokeless tobacco: Never   Vaping Use    Vaping status: Never Used   Substance Use Topics    Alcohol use: Yes     Alcohol/week: 0.0 standard drinks of alcohol     Comment: occasional    Drug use: No       Physical Exam  There is no height or weight on file to calculate BMI.  GENERAL: no distress  SKIN: Visible skin clear. No significant rash, abnormal pigmentation or lesions.  EYES: Eyes grossly normal to inspection.  No discharge or erythema, or obvious scleral/conjunctival abnormalities.  NECK: visible goiter is not present; no visible neck masses  RESP: No audible wheeze, cough, or visible cyanosis.  No visible retractions or increased work of breathing.    NEURO: Awake, alert, responds appropriately to questions.  Mentation and speech fluent.  PSYCH:affect normal and appearance well-groomed.      DATA REVIEW  Please see attached glucose logs  Current Ave  mg/dl

## 2025-01-23 ENCOUNTER — ORDERS ONLY (AUTO-RELEASED) (OUTPATIENT)
Dept: CARDIOLOGY | Facility: HOSPITAL | Age: 50
End: 2025-01-23
Payer: COMMERCIAL

## 2025-01-23 DIAGNOSIS — R00.2 PALPITATIONS: ICD-10-CM

## 2025-01-23 DIAGNOSIS — I47.10 SVT (SUPRAVENTRICULAR TACHYCARDIA): ICD-10-CM

## 2025-01-23 DIAGNOSIS — Z78.9 FALSE POSITIVE CARDIAC STRESS TEST: ICD-10-CM

## 2025-02-10 LAB — CV ZIO PRELIM RESULTS: NORMAL

## 2025-02-17 ENCOUNTER — PATIENT OUTREACH (OUTPATIENT)
Dept: CARE COORDINATION | Facility: CLINIC | Age: 50
End: 2025-02-17
Payer: COMMERCIAL

## 2025-02-24 ENCOUNTER — TELEPHONE (OUTPATIENT)
Dept: CARDIOLOGY | Facility: CLINIC | Age: 50
End: 2025-02-24
Payer: COMMERCIAL

## 2025-02-24 DIAGNOSIS — R00.2 PALPITATIONS: Primary | ICD-10-CM

## 2025-02-24 DIAGNOSIS — I47.10 SVT (SUPRAVENTRICULAR TACHYCARDIA): ICD-10-CM

## 2025-02-24 NOTE — TELEPHONE ENCOUNTER
[facility] Call Center    Phone Message    May a detailed message be left on voicemail: yes     Reason for Call: Other: Pt wife would like  a call back as pt spoke to an after hours provider last week and can't remember his name but was told that his care team would be able to pull up who he spoke to as he would like to switch providers from Dr Arora to the on call provider he spoke with      Action Taken: Other: Cardio    Travel Screening: Not Applicable     Date of Service:                                                                     
[facility] Call Center    Phone Message    May a detailed message be left on voicemail: yes     Reason for Call: Other: Spouse returned call to Priya advising that the pt spoke to the on-call provider on either 1/20/25 or 1/21/25 and to please check those dates. Spouse can be reached at [phone].     Action Taken: Message routed to:  Other: [facility] [[id number]]    Travel Screening: Not Applicable     Date of Service:                                                                     
See alt phone encounter-elena      Spoke with spouse- confirmed on call providers for date range. She will discuss with Pt and call back if they wish to proceed with appt-elena  
Spoke with spouse regarding call back request, discussed in general terms as spouse not on consent to communicate. She will have Pt call in to confirm when/whome he spoke with on call provider to schedule-elena  
DIFFUSE

## 2025-02-24 NOTE — TELEPHONE ENCOUNTER
[facility] Call Center    Phone Message    May a detailed message be left on voicemail: yes     Reason for Call: Other: Patient is requesting to switch from Dr Arora to Dr Zhu. Patient spoke with Dr Zhu while Dr Zhu was on call and patient wants a second opinion from Dr Zhu. Would Dr Zhu be willing make an exception. I informed spouse Trudy that Dr Zhu is retiring in 2025 and that is why he is not taking on others patients.  Patient is willing to do virtual call. Patient states he really liked Dr Zhu when they spoke and is looking for a second opinion and not necessarily ongoing care.      Action Taken: Other: cardio    Travel Screening: Not Applicable     Date of Service:

## 2025-02-27 ENCOUNTER — OFFICE VISIT (OUTPATIENT)
Dept: CARDIOLOGY | Facility: CLINIC | Age: 50
End: 2025-02-27
Payer: COMMERCIAL

## 2025-02-27 VITALS
RESPIRATION RATE: 14 BRPM | HEART RATE: 79 BPM | DIASTOLIC BLOOD PRESSURE: 80 MMHG | WEIGHT: 311 LBS | SYSTOLIC BLOOD PRESSURE: 135 MMHG | BODY MASS INDEX: 41.03 KG/M2

## 2025-02-27 DIAGNOSIS — F41.1 GENERALIZED ANXIETY DISORDER: ICD-10-CM

## 2025-02-27 DIAGNOSIS — G47.33 OSA (OBSTRUCTIVE SLEEP APNEA): ICD-10-CM

## 2025-02-27 DIAGNOSIS — I49.3 PVC'S (PREMATURE VENTRICULAR CONTRACTIONS): Primary | ICD-10-CM

## 2025-02-27 DIAGNOSIS — R00.2 PALPITATIONS: ICD-10-CM

## 2025-02-27 NOTE — PATIENT INSTRUCTIONS
It was a pleasure meeting you today    In summary:    We will get an echocardiogram to evaluate heart structure and function.    Please call my nurse Alfozno Arroyo at [phone] if you have any questions or issues.    We will schedule a follow up visit in  6 months      Alen Morales DO [facility]  Non-invasive Cardiologist  [facility]

## 2025-02-27 NOTE — LETTER
2/27/2025    Laurita Robin DO  [address] [facility] [address]    RE: Dion Tatum       Dear Colleague,     I had the pleasure of seeing Dion Tatum in the [facility].    [facility]   [address]  [place] [address]   [email or web address]   OFFICE: [phone]     CARDIOLOGY CLINIC NOTE     Thank you, Laurita Valentine, for asking the [facility] team to see Mr. Dion Tatum to evaluate New Patient          History of Present Illness   Mr. Dion Tatum is a 49 year old male with a significant past history of DM2, HLD, JEFF on CPAP, obesity, PACs/PVCs who presents for evaluation.  The patient has been seen/managed by my colleague Dr. Arora.  He wanted a second opinion and a review of his testing.  He notes continued palpitations, the description of which is consistent with ectopy.  They seem to come in bursts and h e feels them mostly at night or during times of calm.  He drinks 1 caffeinated beverage a day.  Stress levels seem high but are at baseline.  He wears a CPAP at night.  He does note insufficient amount of sleep between 5.5-6 hours.         Medications  Allergies   Current Outpatient Medications   Medication Sig Dispense Refill     blood glucose (NO BRAND SPECIFIED) lancets standard Use to test blood sugar 1 times daily or as directed. 100 each 3     blood glucose (NO BRAND SPECIFIED) test strip Use to test blood sugar 2 times daily or as directed. To accompany: Blood Glucose Monitor Brands: per insurance. 100 strip 11     blood glucose (NO BRAND SPECIFIED) test strip Use to test blood sugar 1 times daily or as directed. 100 strip 3     EPINEPHrine (EPIPEN/ADRENACLICK/OR ANY BX GENERIC EQUIV) 0.3 MG/0.3ML injection 2-pack Inject 0.3 mLs (0.3 mg) into the muscle as needed for anaphylaxis 0.6 mL 1     fish oil-omega-3 fatty acids 1000 MG capsule Take 2 g by mouth daily.       losartan (COZAAR) 25  MG tablet Take 1 tablet (25 mg) by mouth daily. 90 tablet 1     metFORMIN (GLUCOPHAGE XR) 500 MG 24 hr tablet Take 1 tablet (500 mg) by mouth 2 times daily (with meals). 2 tabs daily 180 tablet 0     Multiple vitamin TABS Take by mouth daily       simvastatin (ZOCOR) 10 MG tablet Take 1 tablet (10 mg) by mouth at bedtime. 90 tablet 0     thin (NO BRAND SPECIFIED) lancets Use to test blood sugar 2 times daily or as directed. To accompany: Blood Glucose Monitor Brands: per insurance. 100 each 11      Allergies   Allergen Reactions     Erythromycin Nausea and Vomiting     Penicillins         Physical Examination Review of Systems   Vitals: /80 (BP Location: Right arm, Patient Position: Sitting, Cuff Size: Adult Large)   Pulse 79   Resp 14   Wt (!) 141.1 kg (311 lb)   BMI 41.03 kg/m    BMI= Body mass index is 41.03 kg/m .  Wt Readings from Last 3 Encounters:   02/27/25 (!) 141.1 kg (311 lb)   01/06/25 137.9 kg (304 lb)   01/06/25 137.7 kg (303 lb 8 oz)       General: pleasant male. No acute distress.   Neck: No JVD  Lungs: clear to auscultation  COR:  regular rate and rhythm, No murmurs, rubs, or gallops  Extrem: No edema        Please refer above for cardiac ROS details.       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 No     Comment: NA - adopted      Service No     Blood Transfusions No     Caffeine Concern No     Occupational Exposure No     Hobby Hazards No     Sleep Concern Yes     Comment: hard time falling asleep     Stress Concern Yes     Weight Concern Yes     Comment: feels he needs to loose some weight     Special Diet No     Back Care No     Exercise Yes     Comment: occasionally     Bike Helmet No     Seat Belt Yes     Self-Exams Yes   Social History Narrative     Not on file     Social Drivers of Health     Financial Resource Strain: Low Risk  (3/19/2024)    Financial Resource Strain      Within the past 12 months, have you or your family members you live with been unable to get utilities (heat, electricity) when it was really needed?: No   Food Insecurity: Low Risk  (3/19/2024)    Food Insecurity      Within the past 12 months, did you worry that your food would run out before you got money to buy more?: No      Within the past 12 months, did the food you bought just not last and you didn t have money to get more?: No   Transportation Needs: Low Risk  (3/19/2024)    Transportation Needs      Within the past 12 months, has lack of transportation kept you from medical appointments, getting your medicines, non-medical meetings or appointments, work, or from getting things that you need?: No   Physical Activity: Unknown (3/19/2024)    Exercise Vital Sign      Days of Exercise per Week: 1 day      Minutes of Exercise per Session: Not on file   Stress: Stress Concern Present (3/19/2024)    [employer] [employer] - Occupational Stress Questionnaire      Feeling of Stress : Rather much   Social Connections: Unknown (3/19/2024)    Social Connection and Isolation Panel [NHANES]      Frequency of Communication with  Friends and Family: Not on file      Frequency of Social Gatherings with Friends and Family: Once a week      Attends Jain Services: Not on file      Active Member of Clubs or Organizations: Not on file      Attends Club or Organization Meetings: Not on file      Marital Status: Not on file   Interpersonal Safety: Low Risk  (10/4/2024)    Interpersonal Safety      Do you feel physically and emotionally safe where you currently live?: Yes      Within the past 12 months, have you been hit, slapped, kicked or otherwise physically hurt by someone?: No      Within the past 12 months, have you been humiliated or emotionally abused in other ways by your partner or ex-partner?: No   Housing Stability: Low Risk  (3/19/2024)    Housing Stability      Do you have housing? : Yes      Are you worried about losing your housing?: No            Lab Results    Chemistry/lipid CBC Cardiac Enzymes/BNP/TSH/INR   Lab Results   Component Value Date    CHOL 185 10/04/2024    HDL 28 (L) 10/04/2024    TRIG 404 (H) 10/04/2024    BUN 18.9 01/06/2025     01/06/2025    CO2 25 01/06/2025    Lab Results   Component Value Date    WBC 10.2 12/19/2024    HGB 14.8 12/19/2024    HCT 42.9 12/19/2024    MCV 87 12/19/2024     12/19/2024    Lab Results   Component Value Date    TSH 1.41 12/19/2024          Cardiac Problems and Cardiac Diagnostics     Most Recent Cardiac testing:  ECG Personal interpretation  12/19/2024  NSR  Nonspecific T wave    Ziopatch 2/10/2025  Zio monitoring from 1/28/2025 to 1/31/2025 (duration 2d 22h)  Predominant rhythm was sinus rhythm, 54 to 129bpm, average 80bpm.  1 episode(s) of nonsustained supraventricular tachycardia - 4 beats, 121bpm.  No sustained tachyarrhythmias.  No atrial fibrillation.  There were no pauses of greater than 3 seconds.  Rare premature atrial contractions beats (isolated <1%).  Rare premature ventricular contractions (isolated <1%).  Symptom triggers and diary entries (12) correlated  to sinus rhythm 74-110bpm with PACs, PVCs.    Stress test: 1/21/2025     Exercise stress ECG positive for ischemia with 1.5 mm horizontal ST depression in the inferior and lateral leads.  ST depression resolves with him 1.5 minutes of recovery.  No symptoms were reported by the patient.     The nuclear stress test is negative for inducible myocardial ischemia or infarction.     The left ventricular ejection fraction at stress is greater than 70%.     A prior study was conducted on 3/24/2014.  Prior images not available for comparison.         Assessment/Recommendations   Assessment:    Mr. Dion Tatum is a 49 year old male with a significant past history of DM2, HLD, JEFF on CPAP, obesity, PACs/PVCs who presents for evaluation.     Palpitations   - Consistent with  PAC/PVCs seen on ziopatch   - Discussed connection with stress, caffeine, sugars, etc   - Discussed benign nature of these extra beats   - TTE to evaluate heart structure and function   - Discussed beta blockers for symptom benefit, however given his overall low burden of ectopy unlikely we will eradicate them and so will hold off for now.   - Recommend increasing sleep duration    Abnormal stress ECG   - Likely false positive stress ECG.  Good exercise capacity.  Same ECG finding on NM stress in 2014   - Does not require further ischemic testing given that other than the palpitations he is asymptomatic.  Would recommend alternative test such as a CCTA in the future if he has symptoms warranting ischemic evaluation.    RTC 6 months    The longitudinal plan of care for the diagnosis(es)/condition(s) as documented were addressed during this visit. Due to the added complexity in care, I will continue to support Dion in the subsequent management and with ongoing continuity of care.         Alen Morales DO [facility]  Non-invasive Cardiologist  [facility]         Thank you for allowing me to participate in the care of your  patient.      Sincerely,     Alen Morales DO     [facility]  cc:   Referred Self, MD  No address on file

## 2025-02-27 NOTE — PROGRESS NOTES
[facility] HEART CARE   [address]  [place] [address]   [email or web address]   OFFICE: [phone]     CARDIOLOGY CLINIC NOTE     Thank you, Laurita Valentine, for asking the [facility] Heart Care team to see Mr. Dion Tatum to evaluate New Patient          History of Present Illness   Mr. Dion Tatum is a 49 year old male with a significant past history of DM2, HLD, JEFF on CPAP, obesity, PACs/PVCs who presents for evaluation.  The patient has been seen/managed by my colleague Dr. Arora.  He wanted a second opinion and a review of his testing.  He notes continued palpitations, the description of which is consistent with ectopy.  They seem to come in bursts and h e feels them mostly at night or during times of calm.  He drinks 1 caffeinated beverage a day.  Stress levels seem high but are at baseline.  He wears a CPAP at night.  He does note insufficient amount of sleep between 5.5-6 hours.         Medications  Allergies   Current Outpatient Medications   Medication Sig Dispense Refill    blood glucose (NO BRAND SPECIFIED) lancets standard Use to test blood sugar 1 times daily or as directed. 100 each 3    blood glucose (NO BRAND SPECIFIED) test strip Use to test blood sugar 2 times daily or as directed. To accompany: Blood Glucose Monitor Brands: per insurance. 100 strip 11    blood glucose (NO BRAND SPECIFIED) test strip Use to test blood sugar 1 times daily or as directed. 100 strip 3    EPINEPHrine (EPIPEN/ADRENACLICK/OR ANY BX GENERIC EQUIV) 0.3 MG/0.3ML injection 2-pack Inject 0.3 mLs (0.3 mg) into the muscle as needed for anaphylaxis 0.6 mL 1    fish oil-omega-3 fatty acids 1000 MG capsule Take 2 g by mouth daily.      losartan (COZAAR) 25 MG tablet Take 1 tablet (25 mg) by mouth daily. 90 tablet 1    metFORMIN (GLUCOPHAGE XR) 500 MG 24 hr tablet Take 1 tablet (500 mg) by mouth 2 times daily (with meals). 2 tabs daily 180 tablet 0    Multiple  vitamin TABS Take by mouth daily      simvastatin (ZOCOR) 10 MG tablet Take 1 tablet (10 mg) by mouth at bedtime. 90 tablet 0    thin (NO BRAND SPECIFIED) lancets Use to test blood sugar 2 times daily or as directed. To accompany: Blood Glucose Monitor Brands: per insurance. 100 each 11      Allergies   Allergen Reactions    Erythromycin Nausea and Vomiting    Penicillins         Physical Examination Review of Systems   Vitals: /80 (BP Location: Right arm, Patient Position: Sitting, Cuff Size: Adult Large)   Pulse 79   Resp 14   Wt (!) 141.1 kg (311 lb)   BMI 41.03 kg/m    BMI= Body mass index is 41.03 kg/m .  Wt Readings from Last 3 Encounters:   02/27/25 (!) 141.1 kg (311 lb)   01/06/25 137.9 kg (304 lb)   01/06/25 137.7 kg (303 lb 8 oz)       General: pleasant male. No acute distress.   Neck: No JVD  Lungs: clear to auscultation  COR:  regular rate and rhythm, No murmurs, rubs, or gallops  Extrem: No edema        Please refer above for cardiac ROS details.       Past History     Family History:   Family History   Adopted: Yes   Problem Relation Age of Onset    Unknown/Adopted Mother     Unknown/Adopted Father         Social History:   Social History     Socioeconomic History    Marital status:      Spouse name: Not on file    Number of children: Not on file    Years of education: Not on file    Highest education level: Not on file   Occupational History    Occupation: Corporate Accounting Mgr     Employer: [employer]   Tobacco Use    Smoking status: Never     Passive exposure: Never    Smokeless tobacco: Never   Vaping Use    Vaping status: Never Used   Substance and Sexual Activity    Alcohol use: Yes     Alcohol/week: 0.0 standard drinks of alcohol     Comment: occasional    Drug use: No    Sexual activity: Yes     Partners: Female     Birth control/protection: Pill   Other Topics Concern    Parent/sibling w/ CABG, MI or angioplasty before 65F 55M? No     Comment: NA - adopted      Service No    Blood Transfusions No    Caffeine Concern No    Occupational Exposure No    Hobby Hazards No    Sleep Concern Yes     Comment: hard time falling asleep    Stress Concern Yes    Weight Concern Yes     Comment: feels he needs to loose some weight    Special Diet No    Back Care No    Exercise Yes     Comment: occasionally    Bike Helmet No    Seat Belt Yes    Self-Exams Yes   Social History Narrative    Not on file     Social Drivers of Health     Financial Resource Strain: Low Risk  (3/19/2024)    Financial Resource Strain     Within the past 12 months, have you or your family members you live with been unable to get utilities (heat, electricity) when it was really needed?: No   Food Insecurity: Low Risk  (3/19/2024)    Food Insecurity     Within the past 12 months, did you worry that your food would run out before you got money to buy more?: No     Within the past 12 months, did the food you bought just not last and you didn t have money to get more?: No   Transportation Needs: Low Risk  (3/19/2024)    Transportation Needs     Within the past 12 months, has lack of transportation kept you from medical appointments, getting your medicines, non-medical meetings or appointments, work, or from getting things that you need?: No   Physical Activity: Unknown (3/19/2024)    Exercise Vital Sign     Days of Exercise per Week: 1 day     Minutes of Exercise per Session: Not on file   Stress: Stress Concern Present (3/19/2024)    [facility] - Occupational Stress Questionnaire     Feeling of Stress : Rather much   Social Connections: Unknown (3/19/2024)    Social Connection and Isolation Panel [NHANES]     Frequency of Communication with Friends and Family: Not on file     Frequency of Social Gatherings with Friends and Family: Once a week     Attends Orthodox Services: Not on file     Active Member of Clubs or Organizations: Not on file     Attends Club or Organization Meetings: Not  on file     Marital Status: Not on file   Interpersonal Safety: Low Risk  (10/4/2024)    Interpersonal Safety     Do you feel physically and emotionally safe where you currently live?: Yes     Within the past 12 months, have you been hit, slapped, kicked or otherwise physically hurt by someone?: No     Within the past 12 months, have you been humiliated or emotionally abused in other ways by your partner or ex-partner?: No   Housing Stability: Low Risk  (3/19/2024)    Housing Stability     Do you have housing? : Yes     Are you worried about losing your housing?: No            Lab Results    Chemistry/lipid CBC Cardiac Enzymes/BNP/TSH/INR   Lab Results   Component Value Date    CHOL 185 10/04/2024    HDL 28 (L) 10/04/2024    TRIG 404 (H) 10/04/2024    BUN 18.9 01/06/2025     01/06/2025    CO2 25 01/06/2025    Lab Results   Component Value Date    WBC 10.2 12/19/2024    HGB 14.8 12/19/2024    HCT 42.9 12/19/2024    MCV 87 12/19/2024     12/19/2024    Lab Results   Component Value Date    TSH 1.41 12/19/2024          Cardiac Problems and Cardiac Diagnostics     Most Recent Cardiac testing:  ECG Personal interpretation  12/19/2024  NSR  Nonspecific T wave    Ziopatch 2/10/2025  Zio monitoring from 1/28/2025 to 1/31/2025 (duration 2d 22h)  Predominant rhythm was sinus rhythm, 54 to 129bpm, average 80bpm.  1 episode(s) of nonsustained supraventricular tachycardia - 4 beats, 121bpm.  No sustained tachyarrhythmias.  No atrial fibrillation.  There were no pauses of greater than 3 seconds.  Rare premature atrial contractions beats (isolated <1%).  Rare premature ventricular contractions (isolated <1%).  Symptom triggers and diary entries (12) correlated to sinus rhythm 74-110bpm with PACs, PVCs.    Stress test: 1/21/2025    Exercise stress ECG positive for ischemia with 1.5 mm horizontal ST depression in the inferior and lateral leads.  ST depression resolves with him 1.5 minutes of recovery.  No symptoms were  reported by the patient.    The nuclear stress test is negative for inducible myocardial ischemia or infarction.    The left ventricular ejection fraction at stress is greater than 70%.    A prior study was conducted on 3/24/2014.  Prior images not available for comparison.         Assessment/Recommendations   Assessment:    Mr. Dion Tatum is a 49 year old male with a significant past history of DM2, HLD, JEFF on CPAP, obesity, PACs/PVCs who presents for evaluation.     Palpitations   - Consistent with  PAC/PVCs seen on ziopatch   - Discussed connection with stress, caffeine, sugars, etc   - Discussed benign nature of these extra beats   - TTE to evaluate heart structure and function   - Discussed beta blockers for symptom benefit, however given his overall low burden of ectopy unlikely we will eradicate them and so will hold off for now.   - Recommend increasing sleep duration    Abnormal stress ECG   - Likely false positive stress ECG.  Good exercise capacity.  Same ECG finding on NM stress in 2014   - Does not require further ischemic testing given that other than the palpitations he is asymptomatic.  Would recommend alternative test such as a CCTA in the future if he has symptoms warranting ischemic evaluation.    RTC 6 months    The longitudinal plan of care for the diagnosis(es)/condition(s) as documented were addressed during this visit. Due to the added complexity in care, I will continue to support Dion in the subsequent management and with ongoing continuity of care.         Alen Morales DO [facility]  Non-invasive Cardiologist  [facility]

## 2025-02-27 NOTE — TELEPHONE ENCOUNTER
Spoke with spouse- confirmed to ok to see Dr. Morales and was pleased with the appt this morning. No further questions or concerns. No need to schedule consult with Dr. Zhu-elena    From: Rashi Zhu MD  Sent: 2/26/2025   4:55 PM CST  To: Priya Mcgraw    I will see him. Could he be put in a new patient slot?    Thanks,    Rashi      From: Priya Mcgraw  Sent: 2/24/2025  12:30 PM CST  To: Rashi Zhu MD    ----- Message from Priya Mcgraw sent at 2/24/2025 12:30 PM CST -----  Hi Dr. Zhu,    Please see encounter, Pt has seen Dr. Arora twice and had spoken with you last month during one of your evenings on call. He is requesting an exception be made to see you for a second opinion. Please advise. Thank you-Priya Zhu does not accept new to you anymore.  Does he want to make an exception?

## 2025-03-03 ENCOUNTER — PATIENT OUTREACH (OUTPATIENT)
Dept: CARE COORDINATION | Facility: CLINIC | Age: 50
End: 2025-03-03
Payer: COMMERCIAL

## 2025-03-26 ENCOUNTER — HOSPITAL ENCOUNTER (OUTPATIENT)
Dept: CARDIOLOGY | Facility: HOSPITAL | Age: 50
Discharge: HOME | End: 2025-03-26
Attending: STUDENT IN AN ORGANIZED HEALTH CARE EDUCATION/TRAINING PROGRAM
Payer: COMMERCIAL

## 2025-03-26 DIAGNOSIS — I49.3 PVC'S (PREMATURE VENTRICULAR CONTRACTIONS): ICD-10-CM

## 2025-03-26 LAB — LVEF ECHO: NORMAL

## 2025-03-26 PROCEDURE — 93306 TTE W/DOPPLER COMPLETE: CPT

## 2025-03-26 PROCEDURE — 93306 TTE W/DOPPLER COMPLETE: CPT | Mod: 26 | Performed by: INTERNAL MEDICINE

## 2025-04-01 DIAGNOSIS — E11.65 TYPE 2 DIABETES MELLITUS WITH HYPERGLYCEMIA, WITHOUT LONG-TERM CURRENT USE OF INSULIN (H): ICD-10-CM

## 2025-04-01 RX ORDER — METFORMIN HYDROCHLORIDE 500 MG/1
500 TABLET, EXTENDED RELEASE ORAL 2 TIMES DAILY WITH MEALS
Qty: 180 TABLET | Refills: 0 | Status: SHIPPED | OUTPATIENT
Start: 2025-04-01

## 2025-04-20 ENCOUNTER — HEALTH MAINTENANCE LETTER (OUTPATIENT)
Age: 50
End: 2025-04-20

## 2025-05-09 DIAGNOSIS — I10 ESSENTIAL HYPERTENSION: ICD-10-CM

## 2025-05-12 RX ORDER — LOSARTAN POTASSIUM 25 MG/1
25 TABLET ORAL DAILY
Qty: 90 TABLET | Refills: 1 | Status: SHIPPED | OUTPATIENT
Start: 2025-05-12

## 2025-07-30 DIAGNOSIS — E11.65 TYPE 2 DIABETES MELLITUS WITH HYPERGLYCEMIA, WITHOUT LONG-TERM CURRENT USE OF INSULIN (H): ICD-10-CM

## 2025-07-30 NOTE — TELEPHONE ENCOUNTER
Medication Question or Refill        What medication are you calling about (include dose and sig)?:    Disp Refills Start End RADHA   metFORMIN (GLUCOPHAGE XR) 500 MG 24 hr tablet 180 tablet 0 4/1/2025 -- No   Sig - Route: Take 1 tablet (500 mg) by mouth 2 times daily (with meals). 2 tabs daily - Oral   Sent to pharmacy as: metFORMIN HCl  MG Oral Tablet Extended Release 24 Hour (GLUCOPHAGE XR)   Class: E-Prescribe   Order: [id number]   E-Prescribing Status: Receipt confirmed by pharmacy (4/1/2025  1:51 PM CDT)       Preferred Pharmacy:    [id number] - [place], [place] - [address]  [address]  [facility] [place] [address]  Phone: [phone] Fax: [phone]      Controlled Substance Agreement on file:   CSA -- Patient Level:    CSA: None found at the patient level.       Who prescribed the medication?: PCP    Do you need a refill? Yes    Could we send this information to you in [facility] or would you prefer to receive a phone call?:   Patient would prefer a phone call   Okay to leave a detailed message?: N/A at Cell number on file:    Telephone Information:   Mobile [phone]       
4 = No assist / stand by assistance

## 2025-07-31 RX ORDER — METFORMIN HYDROCHLORIDE 500 MG/1
500 TABLET, EXTENDED RELEASE ORAL 2 TIMES DAILY WITH MEALS
Qty: 120 TABLET | Refills: 0 | Status: SHIPPED | OUTPATIENT
Start: 2025-07-31

## 2025-08-03 ENCOUNTER — HEALTH MAINTENANCE LETTER (OUTPATIENT)
Age: 50
End: 2025-08-03

## 2025-08-11 ENCOUNTER — MYC REFILL (OUTPATIENT)
Dept: FAMILY MEDICINE | Facility: CLINIC | Age: 50
End: 2025-08-11
Payer: COMMERCIAL

## 2025-08-11 DIAGNOSIS — E11.65 TYPE 2 DIABETES MELLITUS WITH HYPERGLYCEMIA, WITHOUT LONG-TERM CURRENT USE OF INSULIN (H): ICD-10-CM

## 2025-08-11 DIAGNOSIS — I10 ESSENTIAL HYPERTENSION: ICD-10-CM

## 2025-08-12 RX ORDER — METFORMIN HYDROCHLORIDE 500 MG/1
500 TABLET, EXTENDED RELEASE ORAL 2 TIMES DAILY WITH MEALS
Qty: 120 TABLET | Refills: 0 | OUTPATIENT
Start: 2025-08-12

## 2025-08-12 RX ORDER — LOSARTAN POTASSIUM 25 MG/1
25 TABLET ORAL DAILY
Qty: 90 TABLET | Refills: 1 | OUTPATIENT
Start: 2025-08-12